# Patient Record
Sex: FEMALE | Race: WHITE | NOT HISPANIC OR LATINO | Employment: UNEMPLOYED
[De-identification: names, ages, dates, MRNs, and addresses within clinical notes are randomized per-mention and may not be internally consistent; named-entity substitution may affect disease eponyms.]

---

## 2017-04-13 ENCOUNTER — OFFICE VISIT - HEALTHEAST (OUTPATIENT)
Dept: PODIATRY | Age: 42
End: 2017-04-13

## 2017-04-13 DIAGNOSIS — M72.2 PLANTAR FASCIITIS: ICD-10-CM

## 2017-04-13 DIAGNOSIS — M20.40 HAMMER TOE, UNSPECIFIED LATERALITY: ICD-10-CM

## 2017-04-13 ASSESSMENT — MIFFLIN-ST. JEOR: SCORE: 1932.28

## 2017-05-04 ENCOUNTER — AMBULATORY - HEALTHEAST (OUTPATIENT)
Dept: PODIATRY | Age: 42
End: 2017-05-04

## 2017-05-04 DIAGNOSIS — L60.0 INGROWN TOENAIL: ICD-10-CM

## 2017-05-04 DIAGNOSIS — M72.2 PLANTAR FASCIITIS: ICD-10-CM

## 2017-05-04 ASSESSMENT — MIFFLIN-ST. JEOR: SCORE: 1932.28

## 2017-06-05 ENCOUNTER — COMMUNICATION - HEALTHEAST (OUTPATIENT)
Dept: ADMINISTRATIVE | Facility: CLINIC | Age: 42
End: 2017-06-05

## 2017-08-23 ENCOUNTER — RECORDS - HEALTHEAST (OUTPATIENT)
Dept: ADMINISTRATIVE | Facility: OTHER | Age: 42
End: 2017-08-23

## 2017-08-23 ENCOUNTER — COMMUNICATION - HEALTHEAST (OUTPATIENT)
Dept: ADMINISTRATIVE | Facility: CLINIC | Age: 42
End: 2017-08-23

## 2018-06-21 ENCOUNTER — HOME INFUSION (PRE-WILLOW HOME INFUSION) (OUTPATIENT)
Dept: PHARMACY | Facility: CLINIC | Age: 43
End: 2018-06-21

## 2018-06-22 ENCOUNTER — HOME INFUSION (PRE-WILLOW HOME INFUSION) (OUTPATIENT)
Dept: PHARMACY | Facility: CLINIC | Age: 43
End: 2018-06-22

## 2018-06-23 NOTE — PROGRESS NOTES
This is a recent snapshot of the patient's [facility] medical record.  For current drug dose and complete information and questions, call [phone] or In [facility] pool, fv home infusion ([id number])  CSN Number:  [id number]

## 2018-06-25 NOTE — PROGRESS NOTES
This is a recent snapshot of the patient's [facility] medical record.  For current drug dose and complete information and questions, call [phone] or In [facility] pool, fv home infusion ([id number])  CSN Number:  [id number]

## 2018-08-10 ENCOUNTER — HOME INFUSION (PRE-WILLOW HOME INFUSION) (OUTPATIENT)
Dept: PHARMACY | Facility: CLINIC | Age: 43
End: 2018-08-10

## 2018-08-13 NOTE — PROGRESS NOTES
This is a recent snapshot of the patient's [facility] medical record.  For current drug dose and complete information and questions, call [phone] or In Basket pool, fv home infusion ([id number])  CSN Number:  [id number]

## 2021-02-16 ENCOUNTER — TRANSCRIBE ORDERS (OUTPATIENT)
Dept: OTHER | Age: 46
End: 2021-02-16

## 2021-02-16 DIAGNOSIS — L73.2 HIDRADENITIS SUPPURATIVA: Primary | ICD-10-CM

## 2021-02-17 ENCOUNTER — TELEPHONE (OUTPATIENT)
Dept: DERMATOLOGY | Facility: CLINIC | Age: 46
End: 2021-02-17
Payer: COMMERCIAL

## 2021-02-17 NOTE — TELEPHONE ENCOUNTER
[facility] Call Center    Phone Message    May a detailed message be left on voicemail: yes     Reason for Call: Appointment Intake    Referring Provider Name: Referred by Dr Martha Maguire at [facility] to Dr Caio Coronado   Diagnosis and/or Symptoms: for hidradenitis suppurativa    Action Taken: Message routed to:  [facility] ([facility] Dermatology    Travel Screening: Not Applicable

## 2021-03-31 ENCOUNTER — OFFICE VISIT (OUTPATIENT)
Dept: DERMATOLOGY | Facility: CLINIC | Age: 46
End: 2021-03-31
Payer: COMMERCIAL

## 2021-03-31 VITALS — HEART RATE: 100 BPM | SYSTOLIC BLOOD PRESSURE: 140 MMHG | DIASTOLIC BLOOD PRESSURE: 90 MMHG

## 2021-03-31 DIAGNOSIS — L73.2 HIDRADENITIS SUPPURATIVA: Primary | ICD-10-CM

## 2021-03-31 PROCEDURE — 99204 OFFICE O/P NEW MOD 45 MIN: CPT | Performed by: PHYSICIAN ASSISTANT

## 2021-03-31 RX ORDER — SPIRONOLACTONE 50 MG/1
50 TABLET, FILM COATED ORAL 2 TIMES DAILY
Qty: 60 TABLET | Refills: 2 | Status: SHIPPED | OUTPATIENT
Start: 2021-03-31 | End: 2021-08-10

## 2021-03-31 ASSESSMENT — PAIN SCALES - GENERAL: PAINLEVEL: MILD PAIN (2)

## 2021-03-31 NOTE — PROGRESS NOTES
[facility] Dermatology Note  Encounter Date: Mar 31, 2021  Office Visit     Dermatology Problem List:  1. Hidradenitis suppurativa - spironolactone  2. Cystic acne chin- tretinoin   3. Seborrheic keratoses     ____________________________________________    Assessment & Plan:     # Hidradenitis suppurativa- axillary, inguinal, thighs, inframammary.   Discussed treatment options.   Patient may benefit from spirolactone.    -Start spironolactone 50 mg BID. For the first week take 50 mg daily and if no side-effects increase to bid dosing.   -Counseled on side effects of spironolactone including lightheadedness, urinary frequency, spotting between periods and breast tenderness.   -Counseled that spironolactone may take 3-4 months to take clinical effect.    -BP checked today   -Counseled she should avoid pregnancy. She has an IUD and  had a vasectomy.   Discussed other treatment options including isotretinoin, de-suad, Remicade, restarting Humira. Would ideally avoid oral antibiotics with her Crohns.   -A referral was sent to derm/ surgery for de-suad of post auricular lesion.    # Seborrheic keratosis, benign.  # Cellulitis left lower leg finish course of bactrim.   # Candidal intertrigo- inguinal folds, use ketoconazole 2% shampoo daily in the shower. Pt defers further therapy.     Procedures Performed:       Follow-up: 3 month(s) in-person, or earlier for new or changing lesions    Staff:     All risks, benefits and alternatives were discussed with patient.  Patient is in agreement and understands the assessment and plan.  All questions were answered.  Return to Clinic in 3 months or sooner as needed.   Alexandria Herrera PA-C   ____________________________________________    CC: Derm Problem (Estephanie is here today in order to discuss HS. She states that she started noticing flare ups after stopping her Humira tx. She states that it has continually gotten worse and has seemed to spread  other places. Maye also reports and area on her left calf she would like checked. )    HPI:  Ms. Estephanie Brown is a(n) 45 year old female who presents today as a new patient for HS. she is a referral from Dr. Martha Maguire.  She states she has had hidradenitis since she was 14 years old.  It started in the armpits and groin.  Lately it has spread to her thighs, under her breast and behind her left ear.  The area on her left ear feels with sebum and drains at times.  She is interested in having this deroofed.     She states she also has a spot on her left calf.  She recently got her tattoo we touched with ink.  She developed a very itchy area on her lower leg and redness began in the last few days.  She had a virtual visit and was started on Bactrim.  She is taken 1 pill of these.  It is no longer significantly itchy but is unsure if it is changed after 1 dose of antibiotics.  The course is for 5 days.     She is always had regular menses but was put on continuous birth control in college to help with her at bedtime flares.  She noticed that her HS and Crohn's disease flares with her menses.  She was taken off of continuous birth control due to elevated blood pressure.  Her itching is flared some when she went off her birth control.  The Mirena IUD was placed 3 years ago and she is happy on this treatment.  She has not had menses for 3 years until about 2 to 3 months ago.    She had been on Humira for 10 years to help control her Crohn's.  This was eventually stopped as this stopped helping her Crohn's and her HS flared.  She states her HS was not active on Humira.  She is managed through health [facility] for her Crohn's disease.  They are considering Remicade.  She is wondering if this would help her hidradenitis suppurativa as well.    She is working on weight loss through health [facility] clinic and found she had an elevated insulin level consistent with insulin resistance.  She is working on not stacking in between  meals to decrease her insulin levels.    Currently her her Crohn's is being managed with reducing weight and sugar in her diet as well as taking supplements.  She also uses Hibiclens on her axilla and ketoconazole in her groin area, for candida.       She does have some cystic acne on her chin and uses tretinoin on this area with improvement.    She wonders if a spot on her right cheek is a seborrheic keratosis.  She has gotten some seborrheic keratoses on her arms before but typically picks them off.      Patient is otherwise feeling well, without additional skin concerns.           Labs Reviewed:  NA    Physical Exam:  Vitals: BP (!) 140/90   Pulse 100   SKIN: Full skin, which includes the head/face, both arms, chest, back, abdomen,both legs, genitalia and/or groin buttocks, digits and/or nails, was examined.  - There are violaceous macules on the upper thighs, left axilla.   There are a few nodules on the left axilla left and left inframammary area, right upper thigh.   Striae noted to axilla.   There is a skin colored left post auricular thin nodule with tracking.   There are waxy stuck on papules on the right forearm and plaque on the right malar cheek.  There is erythema noted adjacent to the tattoo on her left calf.    Inguinal folds display mild erythema with soft scale   - No other lesions of concern on areas examined.     Medications:  No current outpatient medications on file.     No current facility-administered medications for this visit.       Past Medical History:   There is no problem list on file for this patient.    History reviewed. No pertinent past medical history.    CC No referring provider defined for this encounter. on close of this encounter.

## 2021-03-31 NOTE — LETTER
Date:April 5, 2021      Patient was self referred, no letter generated. Do not send.        [facility] Health Information

## 2021-03-31 NOTE — NURSING NOTE
Dermatology Rooming Note    Estephanie GIOVANNA May's goals for this visit include:   Chief Complaint   Patient presents with     Derm Problem     Estephanie is here today in order to discuss HS. She states that she started noticing flare ups after stopping her Humira tx. She states that it has continually gotten worse and has seemed to spread other places. Maye also reports and area on her left calf she would like checked.      Milo Howard, EMT

## 2021-03-31 NOTE — LETTER
3/31/2021       RE: Estephanie Brown  [address]  [place] [address]     Dear Colleague,    Thank you for referring your patient, Estephanie Brown, to the [facility] DERMATOLOG[facility] CL[facility]IC [place] at [facility]. Please see a copy of my visit note below.    [facility] Dermatology Note  Encounter Date: Mar 31, 2021  Office Visit     Dermatology Problem List:  1. Hidradenitis suppurativa - spironolactone  2. Cystic acne chin- tretinoin   3. Seborrheic keratoses     ____________________________________________    Assessment & Plan:     # Hidradenitis suppurativa- axillary, inguinal, thighs, inframammary.   Discussed treatment options.   Patient may benefit from spirolactone.    -Start spironolactone 50 mg BID. For the first week take 50 mg daily and if no side-effects increase to bid dosing.   -Counseled on side effects of spironolactone including lightheadedness, urinary frequency, spotting between periods and breast tenderness.   -Counseled that spironolactone may take 3-4 months to take clinical effect.    -BP checked today   -Counseled she should avoid pregnancy. She has an IUD and  had a vasectomy.   Discussed other treatment options including isotretinoin, de-suad, Remicade, restarting Humira. Would ideally avoid oral antibiotics with her Crohns.   -A referral was sent to derm/ surgery for de-suad of post auricular lesion.    # Seborrheic keratosis, benign.  # Cellulitis left lower leg finish course of bactrim.   # Candidal intertrigo- inguinal folds, use ketoconazole 2% shampoo daily in the shower. Pt defers further therapy.     Procedures Performed:       Follow-up: 3 month(s) in-person, or earlier for new or changing lesions    Staff:     All risks, benefits and alternatives were discussed with patient.  Patient is in agreement and understands the assessment and plan.  All questions were answered.  Return to Clinic in 3  months or sooner as needed.   Alexandria Herrera PA-C   ____________________________________________    CC: Derm Problem (Estephanie is here today in order to discuss HS. She states that she started noticing flare ups after stopping her Humira tx. She states that it has continually gotten worse and has seemed to spread other places. Maye also reports and area on her left calf she would like checked. )    HPI:  Ms. Estephanie Brown is a(n) 45 year old female who presents today as a new patient for HS. she is a referral from Dr. Martha Maguire.  She states she has had hidradenitis since she was 14 years old.  It started in the armpits and groin.  Lately it has spread to her thighs, under her breast and behind her left ear.  The area on her left ear feels with sebum and drains at times.  She is interested in having this deroofed.     She states she also has a spot on her left calf.  She recently got her tattoo we touched with ink.  She developed a very itchy area on her lower leg and redness began in the last few days.  She had a virtual visit and was started on Bactrim.  She is taken 1 pill of these.  It is no longer significantly itchy but is unsure if it is changed after 1 dose of antibiotics.  The course is for 5 days.     She is always had regular menses but was put on continuous birth control in college to help with her at bedtime flares.  She noticed that her HS and Crohn's disease flares with her menses.  She was taken off of continuous birth control due to elevated blood pressure.  Her itching is flared some when she went off her birth control.  The Mirena IUD was placed 3 years ago and she is happy on this treatment.  She has not had menses for 3 years until about 2 to 3 months ago.    She had been on Humira for 10 years to help control her Crohn's.  This was eventually stopped as this stopped helping her Crohn's and her HS flared.  She states her HS was not active on Humira.  She is managed through [facility] for  her Crohn's disease.  They are considering Remicade.  She is wondering if this would help her hidradenitis suppurativa as well.    She is working on weight loss through [facility] and found she had an elevated insulin level consistent with insulin resistance.  She is working on not stacking in between meals to decrease her insulin levels.    Currently her her Crohn's is being managed with reducing weight and sugar in her diet as well as taking supplements.  She also uses Hibiclens on her axilla and ketoconazole in her groin area, for candida.       She does have some cystic acne on her chin and uses tretinoin on this area with improvement.    She wonders if a spot on her right cheek is a seborrheic keratosis.  She has gotten some seborrheic keratoses on her arms before but typically picks them off.      Patient is otherwise feeling well, without additional skin concerns.           Labs Reviewed:  NA    Physical Exam:  Vitals: BP (!) 140/90   Pulse 100   SKIN: Full skin, which includes the head/face, both arms, chest, back, abdomen,both legs, genitalia and/or groin buttocks, digits and/or nails, was examined.  - There are violaceous macules on the upper thighs, left axilla.   There are a few nodules on the left axilla left and left inframammary area, right upper thigh.   Striae noted to axilla.   There is a skin colored left post auricular thin nodule with tracking.   There are waxy stuck on papules on the right forearm and plaque on the right malar cheek.  There is erythema noted adjacent to the tattoo on her left calf.    Inguinal folds display mild erythema with soft scale   - No other lesions of concern on areas examined.     Medications:  No current outpatient medications on file.     No current facility-administered medications for this visit.       Past Medical History:   There is no problem list on file for this patient.    History reviewed. No pertinent past medical history.    CC No referring  provider defined for this encounter. on close of this encounter.                                  Again, thank you for allowing me to participate in the care of your patient.      Sincerely,    Alexandria Herrera PA-C

## 2021-05-01 ENCOUNTER — HEALTH MAINTENANCE LETTER (OUTPATIENT)
Age: 46
End: 2021-05-01

## 2021-05-27 NOTE — TELEPHONE ENCOUNTER
FUTURE VISIT INFORMATION      FUTURE VISIT INFORMATION:    Date: 6.15.21    Time: 3:15    Location: [facility]  REFERRAL INFORMATION:    Referring provider:  Alexandria Herrera PA-C    Referring providers clinic:  [facility]    Reason for visit/diagnosis  HS de-suad HS lesions left ear, in person per Dr. Paniagua    RECORDS REQUESTED FROM:       Clinic name Comments Records Status Photos Status   [facility] 3.31.21  Alexandria Herrera [facility]

## 2021-05-28 ENCOUNTER — RECORDS - HEALTHEAST (OUTPATIENT)
Dept: ADMINISTRATIVE | Facility: CLINIC | Age: 46
End: 2021-05-28

## 2021-05-29 ENCOUNTER — RECORDS - HEALTHEAST (OUTPATIENT)
Dept: ADMINISTRATIVE | Facility: CLINIC | Age: 46
End: 2021-05-29

## 2021-05-30 ENCOUNTER — RECORDS - HEALTHEAST (OUTPATIENT)
Dept: ADMINISTRATIVE | Facility: CLINIC | Age: 46
End: 2021-05-30

## 2021-05-30 VITALS — HEIGHT: 70 IN | BODY MASS INDEX: 37.94 KG/M2 | WEIGHT: 265 LBS

## 2021-05-31 ENCOUNTER — RECORDS - HEALTHEAST (OUTPATIENT)
Dept: ADMINISTRATIVE | Facility: CLINIC | Age: 46
End: 2021-05-31

## 2021-05-31 VITALS — BODY MASS INDEX: 37.94 KG/M2 | WEIGHT: 265 LBS | HEIGHT: 70 IN

## 2021-06-01 ENCOUNTER — RECORDS - HEALTHEAST (OUTPATIENT)
Dept: ADMINISTRATIVE | Facility: CLINIC | Age: 46
End: 2021-06-01

## 2021-06-02 ENCOUNTER — RECORDS - HEALTHEAST (OUTPATIENT)
Dept: ADMINISTRATIVE | Facility: CLINIC | Age: 46
End: 2021-06-02

## 2021-06-03 ENCOUNTER — OFFICE VISIT (OUTPATIENT)
Dept: DERMATOLOGY | Facility: CLINIC | Age: 46
End: 2021-06-03
Payer: COMMERCIAL

## 2021-06-03 VITALS — DIASTOLIC BLOOD PRESSURE: 79 MMHG | WEIGHT: 293 LBS | SYSTOLIC BLOOD PRESSURE: 125 MMHG | HEART RATE: 93 BPM

## 2021-06-03 DIAGNOSIS — L73.2 HIDRADENITIS SUPPURATIVA: Primary | ICD-10-CM

## 2021-06-03 PROCEDURE — 11900 INJECT SKIN LESIONS </W 7: CPT | Performed by: DERMATOLOGY

## 2021-06-03 PROCEDURE — 99213 OFFICE O/P EST LOW 20 MIN: CPT | Mod: 25 | Performed by: DERMATOLOGY

## 2021-06-03 RX ORDER — MULTIVIT-MIN/IRON/FOLIC ACID/K 18-600-40
CAPSULE ORAL DAILY
COMMUNITY

## 2021-06-03 RX ORDER — LOPERAMIDE HYDROCHLORIDE 2 MG/1
TABLET ORAL DAILY
COMMUNITY

## 2021-06-03 RX ORDER — NICOTINE 14MG/24HR
PATCH, TRANSDERMAL 24 HOURS TRANSDERMAL DAILY
COMMUNITY

## 2021-06-03 RX ORDER — EPINEPHRINE 0.15 MG/.3ML
INJECTION INTRAMUSCULAR PRN
COMMUNITY

## 2021-06-03 RX ORDER — AMITRIPTYLINE HYDROCHLORIDE 10 MG/1
TABLET ORAL DAILY
COMMUNITY
Start: 2021-02-11

## 2021-06-03 RX ORDER — ALBUTEROL SULFATE 90 UG/1
AEROSOL, METERED RESPIRATORY (INHALATION)
COMMUNITY

## 2021-06-03 RX ORDER — GABAPENTIN 300 MG/1
CAPSULE ORAL 3 TIMES DAILY
COMMUNITY

## 2021-06-03 RX ORDER — ALPRAZOLAM 0.25 MG
TABLET ORAL
COMMUNITY

## 2021-06-03 RX ORDER — SIMETHICONE 80 MG
TABLET,CHEWABLE ORAL DAILY
COMMUNITY

## 2021-06-03 RX ORDER — AMLODIPINE BESYLATE 5 MG/1
TABLET ORAL DAILY
COMMUNITY
Start: 2021-04-19

## 2021-06-03 RX ORDER — VENLAFAXINE HYDROCHLORIDE 150 MG/1
CAPSULE, EXTENDED RELEASE ORAL DAILY
COMMUNITY
Start: 2021-03-12

## 2021-06-03 RX ORDER — FOLIC ACID 1 MG/1
TABLET ORAL DAILY
COMMUNITY

## 2021-06-03 RX ORDER — KETOCONAZOLE 20 MG/ML
SHAMPOO TOPICAL
COMMUNITY
Start: 2021-02-09

## 2021-06-03 RX ORDER — MULTIPLE VITAMINS W/ MINERALS TAB 9MG-400MCG
TAB ORAL DAILY
COMMUNITY

## 2021-06-03 RX ORDER — MONTELUKAST SODIUM 4 MG/1
TABLET, CHEWABLE ORAL DAILY
COMMUNITY
Start: 2021-05-06

## 2021-06-03 RX ORDER — CHLORAL HYDRATE 500 MG
CAPSULE ORAL DAILY
COMMUNITY

## 2021-06-03 RX ORDER — MULTIVITAMIN WITH IRON
TABLET ORAL DAILY
COMMUNITY

## 2021-06-03 RX ORDER — LOSARTAN POTASSIUM 100 MG/1
TABLET ORAL DAILY
COMMUNITY
Start: 2021-03-15

## 2021-06-03 RX ORDER — BUSPIRONE HYDROCHLORIDE 10 MG/1
TABLET ORAL 3 TIMES DAILY
COMMUNITY
Start: 2021-03-15

## 2021-06-03 RX ORDER — ASPIRIN 81 MG/1
TABLET, CHEWABLE ORAL
COMMUNITY

## 2021-06-03 RX ORDER — FLUCONAZOLE 150 MG/1
TABLET ORAL DAILY
COMMUNITY
Start: 2021-03-30

## 2021-06-03 ASSESSMENT — PAIN SCALES - GENERAL: PAINLEVEL: MILD PAIN (3)

## 2021-06-03 NOTE — PROGRESS NOTES
Drug Administration Record    Prior to injection, verified patient identity using patient's name and date of birth.  Due to injection administration, patient instructed to remain in clinic for 15 minutes  afterwards, and to report any adverse reaction to me immediately.    Drug Name: triamcinolone acetonide(kenalog)  Dose: 4mL of triamcinolone 10mg/mL, 40mg dose  Route administered: ID  NDC #: Kenalog-10 (9353-1960-97)  Amount of waste(mL):1mL  Reason for waste: Single use vial    LOT #: [id number]  SITE: see note  : [employer]  EXPIRATION DATE: sept 2022

## 2021-06-03 NOTE — NURSING NOTE
Chief Complaint   Patient presents with     Derm Problem     HS, new, flaring ears, groin, breast, buttocks, abdomen     Milena Ruiz, EMT

## 2021-06-03 NOTE — LETTER
6/3/2021       RE: Estephanie Brown  [address]  [place] [address]     Dear Colleague,    Thank you for referring your patient, Estephanie Brown, to the [facility] DERMATOLOGY CLINIC [place] at [facility]. Please see a copy of my visit note below.    HS Uun-it-Sviea Checklist      Follow up: virtual     Labs today? NO     Fax labs to outside [place] location:      Imaging needed? NO    Referrals placed? NO    Infusion Start? NO    Infusion change/dose increase NO    Biologics change? NO    Wound care supplies? (print orders for faxing) NO    Reach out to outside providers? NO    Photos and photo consent done? NO    [facility] Dermatology Note  Encounter Date: Ellis 3, 2021  Office Visit     Dermatology Problem List:  1. Hidradenitis suppurativa  2. Cystic acne chin  3. Seborrheic keratoses  4. Mirena in place  5. Crohns disease  - Previously treated with:     Humira - helped but stopped helping Crohn's so they stopped it   Enbrel  - did not help HS or Crohns   Stelara - did not help HS or Crohns   Azathioprine - Helped Crohns but not HS. Stopped azathioprine  because of elvated LFTs, but Crohns never flard back up  6. 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 Oral allergy syndrome  Family hx of colitis: Father, Maternal uncle  Patient also exists in our medical record with MRN: [id number] & [id number]    ____________________________________________    Assessment & Plan:   # Hidradenitis suppurativa.  -Slightly improved on spironolactone and starting metformin. She would like to see how this combination does before making any changes.    - We discussed several options including re-trying humira, now that crohns is controlled, infliximab (which she is hesitant to try due to her family member's experience), clinical trial vs other  - We injected 4 lesions with kenalog-10 with a total of 4cc      Follow-up: Will schedule follow-up in 12 weeks  to see how she responds to the metformin/spironolactone      Staff:     Caio Coronado MD, FAAD    Departments of Internal Medicine and Dermatology  [facility]  [phone]    Addendum 6/29/21:   Pt developed nausea with hepatitis with cholestatic picture after a month of combination spironolactone and metformin. She stopped the meds and is doing better. Will schedule 6 week phone follow-up to decide on next steps.  __________________________________________    CC: Derm Problem (HS, new, flaring ears, groin, breast, buttocks, abdomen)    HPI:  Ms. Estephanie Brown is a(n) 46 year old female who presents today as a return patient for Hidradenitis suppurativa. She developed her first HS symptoms at age 14 and has been dealing with this since. She also has a history of Crohn's disease and was on Humira for a while for this, and her HS symptoms were significantly reduced. She unfortunately had to stop these injections though as her Crohns symptoms were not well controlled. She then tried other meds for her Crohns including stelara and azathioprine which did not help the HS. Currently the Crohns is well quiet but the HS flared.  She szaw Alexandria Herrera in March who started her on spironolactone. She just started this 2 weeks ago. She is also planning on starting metformin for elevated fasting insulin levels. Prescribed by PCP.    Patient is otherwise feeling well, without additional skin concerns.    - No hx of pregnancy   - Has been  Intermittent fasting    HS Nurse Assessment    Nurse Assessment Data 6/3/2021   Over the past month, about how many recurrent or new boils have you had? 11   Over the past week, how many dressing changes do you do each day? 1   Over the past week, has your wound drainage been: Moderate   Rate your HS overall from 0-10 (0 = no disease, 10 = worst) over the past week:  7   Rate your pain score from 0-10 (0 = no disease, 10 = worst) for the most  "painful/symptomatic lesion in the past week:  5 - Moderate Pain   Over the past week, how much has HS influenced your quality of life? very much     Physical Exam:  Vitals: /79 (BP Location: Left arm)   Pulse 93   Wt 136.1 kg (300 lb)     HS Exam  HS Exam  Head/Neck 6/3/2021   Head/Neck Comments Scarring and scar tunnels behind ears with small cysts       Left Axilla 6/3/2021   Left Axilla Comments 3 scarred pink papules; Elia notes some \"tunneling\" in the area         Chest 6/3/2021   # of inflamed nodules 1   # of abcesses 0   # of draining tunnels 0       Abdomen 6/3/2021   Abdomen Comments 1 pink scarred papule on the upper abdomen        Pubis/Genital 6/3/2021   # of inflamed nodules 0   # of abcesses 0   # of draining tunnels 1       Left Thigh 6/3/2021   # of inflamed nodules 1   # of abcesses 0   # of draining tunnels 0       No flowsheet data found.    No flowsheet data found.    Buttocks 6/3/2021   Pilonidal Disease? No   Pioderma Gangrenosum? No   Cutaneous Crohn's? No       HS Data  HS Exam Data 6/3/2021   LC Type LC1   Clinical Subtypes Regular type   Acne? No   Dissecting Cellulitis? No   Total Robertson Stage II   Total Inflammatory Nodules 2   Total Abcesses 0   Total Draining Tunnels 1   Total Abscess and Nodule Count 2   IHS4 Score  6         - No other lesions of concern on areas examined.     Medications:  Current Outpatient Medications   Medication     Acetaminophen 325 MG CAPS     albuterol (PROAIR HFA/PROVENTIL HFA/VENTOLIN HFA) 108 (90 Base) MCG/ACT inhaler     ALPRAZolam (XANAX) 0.25 MG tablet     amitriptyline (ELAVIL) 10 MG tablet     amLODIPine (NORVASC) 5 MG tablet     Ascorbic Acid (VITAMIN C) 500 MG CHEW     aspirin (ASA) 81 MG chewable tablet     busPIRone (BUSPAR) 10 MG tablet     colestipol (COLESTID) 1 g tablet     Cyanocobalamin 1000 MCG/15ML LIQD     EPINEPHrine (EPIPEN JR) 0.15 MG/0.3ML injection 2-pack     fish oil-omega-3 fatty acids 1000 MG capsule     fluconazole " (DIFLUCAN) 150 MG tablet     folic acid (FOLVITE) 1 MG tablet     gabapentin (NEURONTIN) 300 MG capsule     ketoconazole (NIZORAL) 2 % external shampoo     levonorgestrel (MIRENA) 20 MCG/24HR IUD     loperamide (IMODIUM A-D) 2 MG tablet     losartan (COZAAR) 100 MG tablet     magnesium 250 MG tablet     metFORMIN (GLUCOPHAGE) 500 MG tablet     multivitamin w/minerals (MULTI-VITAMIN) tablet     Saccharomyces boulardii (PROBIOTIC) 250 MG CAPS     simethicone (MYLICON) 80 MG chewable tablet     venlafaxine (EFFEXOR-XR) 150 MG 24 hr capsule     Vitamin D, Cholecalciferol, 25 MCG (1000 UT) TABS     spironolactone (ALDACTONE) 50 MG tablet     No current facility-administered medications for this visit.       Past Medical History:      No referring provider defined for this encounter. on close of this encounter.      Drug Administration Record    Prior to injection, verified patient identity using patient's name and date of birth.  Due to injection administration, patient instructed to remain in clinic for 15 minutes  afterwards, and to report any adverse reaction to me immediately.    Drug Name: triamcinolone acetonide(kenalog)  Dose: 4mL of triamcinolone 10mg/mL, 40mg dose  Route administered: ID  NDC #: Kenalog-10 (4041-2781-80)  Amount of waste(mL):1mL  Reason for waste: Single use vial    LOT #: [id number]  SITE: see note  : [employer]  EXPIRATION DATE: sept 2022

## 2021-06-03 NOTE — PROGRESS NOTES
HS Zmq-es-Heizn Checklist      Follow up: virtual     Labs today? NO     Fax labs to outside [place] location:      Imaging needed? NO    Referrals placed? NO    Infusion Start? NO    Infusion change/dose increase NO    Biologics change? NO    Wound care supplies? (print orders for faxing) NO    Reach out to outside providers? NO    Photos and photo consent done? NO

## 2021-06-10 NOTE — PROGRESS NOTES
Assessment: Left plantar fasciitis; ingrown medial border of the right 3rd toenail.    Plan: We repeated the cortisone injection of the left heel.  She was given 4 mg of dexamethasone sodium phosphate along the plantar fascial insertion.  Attention was directed to the right third toe.  Local anesthesia was given, consisting of 2 ml of2% lidocaine plain. The toe was prepped with betadine. A tourniquet was fixed at the base of the toe to maintain local hemostasis. The offending nail border was freed from surrounding soft tissues and excised. The nail root was treated with phenol for 30 seconds x3 applications. The site was flushed with alcohol and dressed with bacitracin, Telfa and gauze. The tourniquet was removed and good perfusion was restored to the toe. The patient was given written and verbal post-procedure instructions. Follow up here as needed.        Subjective: The patient returns to the [facility] with continued left plantar heel pain and an ingrown right third toenail.  At her last visit we talked about removing the medial border of the right third toenail, and she is ready to proceed with that today.  Cortisone injection in the left heel did not provide much relief, and she is interested in repeating that injection.    Physical Exam:  General: Pleasant 42 y.o. female in no acute distress.  Vascular: DP pulses are palpable. PT pulses are palpable. Pedal hair is present. Feet are warm to the touch.  Cardiac: Pulse is regular.  Lymphatic: No edema at the ankles.  Neuro: Sensation in the feet is grossly intact to light touch.  Derm: Medial side of the right third toenail is incurved and mildly tender to touch today.  No active bleeding or drainage.  Musculoskeletal: Tenderness around the medial and plantar side of the left heel at the plantar fascial insertion.

## 2021-06-10 NOTE — PROGRESS NOTES
"ASSESSMENT: Left plantar fasciitis, bilateral fourth hammertoes with prominent skin flaps plantarly, ingrown toenail    PLAN: She was given an injection of 4 mg of dexamethasone sodium phosphate into the left heel at the plantar fascial insertion.  She tolerated that well.  She will mention plantar fasciitis to her physical therapist and pursue treatment there.  We reviewed simple home stretching techniques to improve pain.  She would like to schedule bilateral fourth toe skinplasty to remove the prominent skin wedges that underlapped the neighboring third toes.  We reviewed the procedure and what to expect during her recovery.  She will also schedule a partial nail avulsion with chemical matrixectomy for the medial side of the right third toenail.  We can do that procedure in the clinic.  She will need a preoperative history and physical for the toe skinplasty.      SUBJECTIVE: New patient visit at the [facility] regarding left heel pain and skin irritation on the bilateral fourth toes.  She has had some trouble with her toenails since starting the methotrexate.  The right great toenail has fallen off and regrown at least once.  The medial edge of the right third toenail tends to curve and become ingrown as well.  No acute infection there today, but she would like to have that treated in the future.  She has a history of plantar fasciitis and an x-ray in 2009 demonstrated a plantar calcaneal spur.  She recalls having a cortisone injection in the heel almost 13 years ago.  Good relief from now until the last few months.  No new trauma.  She describes post static pain.  No radiation into the Achilles.    PHYSICAL EXAM:  Pulse 92  Resp 14  Ht 5' 10\" (1.778 m)  Wt (!) 265 lb (120.2 kg)  BMI 38.02 kg/m2  General: Pleasant 41 y.o. female in no acute distress.  Vascular: DP pulses are palpable. PT pulses are palpable. Pedal hair is present. Feet are warm to the touch.  Cardiac: Pulse is regular.  Lymphatic: " No edema at the ankles.  Neuro: Sensation in the feet is grossly intact to light touch.  Derm: Toenails are mildly dystrophic.  The right third toenail is incurved at the medial border.  Prominent skin plantarly on the bilateral fourth toes has moved under the neighboring third toes causing irritation with weightbearing.  Musculoskeletal: Bilateral fourth toes are medially deviated under the third toes.  Bunionettes are noted.  Pain with palpation at the proximal insertion of the left plantar fascia.      Allergies   Allergen Reactions     Morphine        No current outpatient prescriptions on file.     No current facility-administered medications for this visit.        Family History:  family history is not on file.    Social History:  Reviewed, and she reports that she has never smoked. She does not have any smokeless tobacco history on file.    Review of Systems:  A 12 point comprehensive review of systems was negative except as noted.

## 2021-06-15 ENCOUNTER — TELEPHONE (OUTPATIENT)
Dept: DERMATOLOGY | Facility: CLINIC | Age: 46
End: 2021-06-15

## 2021-06-15 ENCOUNTER — OFFICE VISIT (OUTPATIENT)
Dept: DERMATOLOGY | Facility: CLINIC | Age: 46
End: 2021-06-15
Payer: COMMERCIAL

## 2021-06-15 ENCOUNTER — PRE VISIT (OUTPATIENT)
Dept: DERMATOLOGY | Facility: CLINIC | Age: 46
End: 2021-06-15

## 2021-06-15 DIAGNOSIS — L73.2 HIDRADENITIS SUPPURATIVA: Primary | ICD-10-CM

## 2021-06-15 PROCEDURE — 99213 OFFICE O/P EST LOW 20 MIN: CPT | Mod: GC | Performed by: DERMATOLOGY

## 2021-06-15 ASSESSMENT — PAIN SCALES - GENERAL: PAINLEVEL: MILD PAIN (3)

## 2021-06-15 NOTE — PROGRESS NOTES
Dermatologic Surgery Note    Dermatology Surgery Clinic  [facility]  [address] [place] [id number]    Dermatology Problem List  (1) Hidradenitis Suppurativa     Subjective: Ms. Brown is a pleasant 46 year old woman with history of hidradenitis suppurativa who presents today for consultation for surgical intervention of HS.     The patient has a history of hidradenitis since the age of 14, she had tg Robertson stage I up until about 18 months ago. Areas on the bilateral axillae are most bothersome as well as in the post auricular sulci. She is currently on spironolactone which seems to be controlling her disease moderately well. She also has Crohn's disease and has been treated in the past with Humira, Enbrel, Azathioprine, which all became ineffective in time.     She is interested in considering surgical intervention for areas behind the bilateral ears and the axillae.      She reports developing cysts in her retroauricular area though she has none at present    Objective:   Gen: This is a well appearing female in no acute distress. Patient is alert and oriented x 3.  An exam of the face neck and axilla was performed today   - Left axilla with scattered pink to red brown nodules and few puncta/sinus tracts   - Bilateral post auricular sulci with numerous minute (<0.05 mm) skin colored puncta without significant underlying substance     Assessment and Plan:     (1) Hidradenitis suppurativa  (2) Post auricular puncta      Patient was discussed with and evaluated by attending physician Dr. dayana Hastings MD  PGY-6    Micrographic Surgery and Dermatologic Oncology Fellow  Jamee 15, 2021      Attending Attestation:  I personally interviewed and examined the patient.  The patient was discussed with the resident.  I reviewed the resident note and agree with the findings.  Any edits are below.    History: HS and crohns history    PE: open comedomes in  retroauricular sulcus    A/P: HS -- she has axillary disease amenable to surgical intervention but is also not on maximized medical therapy.  Discussed these two options and the limitations of surgery in the setting of mainly inflammatory disease.  The areas behind her ears are more comedomal/transient pustules and are not present now.  As such I don't feel that surgery would add any real benefit to her at this time.      Marcial Paniagua M.D.  Professor  Director of Dermatologic Surgery  Department of Dermatology

## 2021-06-15 NOTE — LETTER
6/15/2021       RE: Estephanie Brown  [address] [facility] [address]     Dear Colleague,    Thank you for referring your patient, Estephanie Brown, to the [facility]INIC [place] at [facility]. Please see a copy of my visit note below.    Dermatologic Surgery Note    Dermatology Surgery Clinic  [facility]  [address] [place] [id number]    Dermatology Problem List  (1) Hidradenitis Suppurativa     Subjective: Ms. Brown is a pleasant 46 year old woman with history of hidradenitis suppurativa who presents today for consultation for surgical intervention of HS.     The patient has a history of hidradenitis since the age of 14, she had tg Robertson stage I up until about 18 months ago. Areas on the bilateral axillae are most bothersome as well as in the post auricular sulci. She is currently on spironolactone which seems to be controlling her disease moderately well. She also has Crohn's disease and has been treated in the past with Humira, Enbrel, Azathioprine, which all became ineffective in time.     She is interested in considering surgical intervention for areas behind the bilateral ears and the axillae.      She reports developing cysts in her retroauricular area though she has none at present    Objective:   Gen: This is a well appearing female in no acute distress. Patient is alert and oriented x 3.  An exam of the face neck and axilla was performed today   - Left axilla with scattered pink to red brown nodules and few puncta/sinus tracts   - Bilateral post auricular sulci with numerous minute (<0.05 mm) skin colored puncta without significant underlying substance     Assessment and Plan:     (1) Hidradenitis suppurativa  (2) Post auricular puncta      Patient was discussed with and evaluated by attending physician Dr. dayana Hastings MD  PGY-6    Micrographic Surgery and  Dermatologic Oncology Fellow  Jamee 15, 2021      Attending Attestation:  I personally interviewed and examined the patient.  The patient was discussed with the resident.  I reviewed the resident note and agree with the findings.  Any edits are below.    History: HS and crohns history    PE: open comedomes in retroauricular sulcus    A/P: HS -- she has axillary disease amenable to surgical intervention but is also not on maximized medical therapy.  Discussed these two options and the limitations of surgery in the setting of mainly inflammatory disease.  The areas behind her ears are more comedomal/transient pustules and are not present now.  As such I don't feel that surgery would add any real benefit to her at this time.      Marcial Paniagua M.D.  Professor  Director of Dermatologic Surgery  Department of Dermatology                Drug Administration Record    Prior to injection, verified patient identity using patient's name and date of birth.  Due to injection administration, patient instructed to remain in clinic for 15 minutes  afterwards, and to report any adverse reaction to me immediately.    Drug Name: triamcinolone acetonide(kenalog)  Dose: mL  Route administered: ID  NDC #: Kenalog-10 (6893-1931-76)  Amount of waste(mL):4ML  Reason for waste: Single use vial    LOT #: [id number]  SITE: see note  : [employer]  EXPIRATION DATE: SEP 2022

## 2021-06-15 NOTE — PROGRESS NOTES
Drug Administration Record    Prior to injection, verified patient identity using patient's name and date of birth.  Due to injection administration, patient instructed to remain in clinic for 15 minutes  afterwards, and to report any adverse reaction to me immediately.    Drug Name: triamcinolone acetonide(kenalog)  Dose: mL  Route administered: ID  NDC #: Kenalog-10 (0345-0695-77)  Amount of waste(mL):4ML  Reason for waste: Single use vial    LOT #: [id number]  SITE: see note  : [place]  EXPIRATION DATE: SEP 2022

## 2021-06-15 NOTE — TELEPHONE ENCOUNTER
[facility] Call Center    Phone Message    May a detailed message be left on voicemail: yes     Reason for Call: Appointment Intake    Referring Provider Name:   Pt of Dr Coronado    Diagnosis and/or Symptoms:   Pt requesting Nurse appt for injecting HS spots    Action Taken: Message routed to:  [facility] ([facility] Derm    Travel Screening: Not Applicable     Pt has appt 6/15/21 at 3:15 and wants to have Nurse injections as well. Please call Pt to advise.    Thanks!

## 2021-06-15 NOTE — NURSING NOTE
Chief Complaint   Patient presents with     Derm Problem     here for HS lesions behind ears that keep filling, would like these areas removed     Milena Ruiz, EMT

## 2021-06-16 NOTE — TELEPHONE ENCOUNTER
Informed the patient that we are not able to add people to the nurse visit for injections.    Debbie Kendrick, [facility]

## 2021-07-21 ENCOUNTER — RECORDS - HEALTHEAST (OUTPATIENT)
Dept: ADMINISTRATIVE | Facility: CLINIC | Age: 46
End: 2021-07-21

## 2021-08-06 DIAGNOSIS — L73.2 HIDRADENITIS SUPPURATIVA: ICD-10-CM

## 2021-08-10 RX ORDER — SPIRONOLACTONE 50 MG/1
50 TABLET, FILM COATED ORAL 2 TIMES DAILY
Qty: 60 TABLET | Refills: 0 | Status: SHIPPED | OUTPATIENT
Start: 2021-08-10 | End: 2021-09-07

## 2021-08-10 NOTE — TELEPHONE ENCOUNTER
Spironolactone Oral Tablet 50 MG  Last Written Prescription Date:  3/31/2021-5/30/2021  Last Fill Quantity: 60,   # refills: 2  Last Office Visit : 6/15/2021  Future Office visit:  8/12/2021    Routing refill request to provider for review/approval because:  Last order filled with a start and end date??  Continue same dose and orders?  Refer to Provider for review       Ilda Rivas RN  Central Triage Red Flags/Med Refills

## 2021-08-14 DIAGNOSIS — L73.2 HIDRADENITIS SUPPURATIVA: ICD-10-CM

## 2021-08-18 RX ORDER — SPIRONOLACTONE 50 MG/1
TABLET, FILM COATED ORAL
Qty: 60 TABLET | Refills: 0 | OUTPATIENT
Start: 2021-08-18

## 2021-09-02 DIAGNOSIS — L73.2 HIDRADENITIS SUPPURATIVA: ICD-10-CM

## 2021-09-07 NOTE — TELEPHONE ENCOUNTER
spironolactone (ALDACTONE) 50 MG tablet      Last Written Prescription Date:  8/10/2021  Last Fill Quantity: 60 tab,   # refills: 0  Last Office Visit : 8/12/2021  Future Office visit:  9/9/2021    Routing refill request to provider for review/approval because:  Failed medication protocol: labs due  - recommended labs past due Creatinine, Potassium, Sodium

## 2021-09-08 RX ORDER — SPIRONOLACTONE 50 MG/1
50 TABLET, FILM COATED ORAL 2 TIMES DAILY
Qty: 180 TABLET | Refills: 0 | Status: SHIPPED | OUTPATIENT
Start: 2021-09-08 | End: 2022-01-04

## 2021-09-09 ENCOUNTER — VIRTUAL VISIT (OUTPATIENT)
Dept: DERMATOLOGY | Facility: CLINIC | Age: 46
End: 2021-09-09
Payer: COMMERCIAL

## 2021-09-09 DIAGNOSIS — L73.2 HIDRADENITIS SUPPURATIVA: Primary | ICD-10-CM

## 2021-09-09 PROCEDURE — 99214 OFFICE O/P EST MOD 30 MIN: CPT | Mod: TEL | Performed by: DERMATOLOGY

## 2021-09-09 NOTE — PROGRESS NOTES
[facility] Dermatology Note  Encounter Date: Sep 9, 2021  Store-and-Forward and Telephone ([phone] ). Location of teledermatologist: [facility]MAT[facility] [place].  Start time: 8:15pm . End time: 8:37     Dermatology Problem List:  1. Hidradenitis suppurativa  2. Cystic acne chin  3. Seborrheic keratoses  4. Mirena in place  5. Crohns disease  - Previously treated with:                Humira - helped but stopped helping Crohn's so they stopped it              Enbrel  - did not help HS or Crohns              Stelara - did not help HS or Crohns              Azathioprine - Helped Crohns but not HS. Stopped azathioprine     because of elvated LFTs, but Crohns never flard back up  6. 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 Oral allergy syndrome  Family hx of colitis: Father, Maternal uncle  Patient also exists in our medical record with MRN: [id number] & [id number]     ____________________________________________     Assessment & Plan:   # HS  -  Doing OK. Just started spironolactone again and rechecking liver function in 3 weeks. If OK, will increase to 100mg daily and touch base in 12 weeks via phone to see if she wants to try something new. IBD has been flaring more. LFTs were >5x upper limit ofnormal. While I couldn't see her results this past week, she states LFTs wnl.  She notes oral ulcers and GI symptomsworse. I suspect crohns flaring. Would try golimumab next.     Follow-up: 12 weeks     Staff:     Caio Coronado MD, FAAD, FACP     Departments of Internal Medicine and Dermatology  [facility]  [phone]  ____________________________________________     CC: Follow-up     HPI:  Ms. Estephanie Brown is a(n) 46 year old female who presents today as a return patient for HS. The patient was last seen by myself on 6/3/21 at which time she was continued on spironolactone and metformin. We discussed adding Humira vs infliximab vs clinical trial,  and elected to hold off for now. Additionally, 4 lesions were injected with ILK 10. In the mean time, she was seen by Dr. Paniagua, who did not think she was a good candidate for surgical excision at this time.       Lesions ar coming in new places. Extending to thigh and buttocks.    Started using pit liquor that is ETOH based and seems to be helping under arms.    She started metformin and spironolactone together and had to stop because of the liver. She restarted the spironolactone alone 5 weeks ago.  She had normal LFTs on 9/5/21 after one month of spironolactone 50mg. Feeling well. She thinks spironolactone did help.     HS Nurse Assessment    Nurse Assessment Data 6/3/2021 9/9/2021   Over the past month, about how many recurrent or new boils have you had? 11 7   Over the past week, how many dressing changes do you do each day? 1 1   Over the past week, has your wound drainage been: Moderate Moderate   Rate your HS overall from 0-10 (0 = no disease, 10 = worst) over the past week:  7 5   Rate your pain score from 0-10 (0 = no disease, 10 = worst) for the most painful/symptomatic lesion in the past week:  5 - Moderate Pain 6   Over the past week, how much has HS influenced your quality of life? very much moderately     Patient is otherwise feeling well, without additional skin concerns.    Medications:    Current Outpatient Medications   Medication     Acetaminophen 325 MG CAPS     albuterol (PROAIR HFA/PROVENTIL HFA/VENTOLIN HFA) 108 (90 Base) MCG/ACT inhaler     ALPRAZolam (XANAX) 0.25 MG tablet     amitriptyline (ELAVIL) 10 MG tablet     amLODIPine (NORVASC) 5 MG tablet     Ascorbic Acid (VITAMIN C) 500 MG CHEW     aspirin (ASA) 81 MG chewable tablet     busPIRone (BUSPAR) 10 MG tablet     colestipol (COLESTID) 1 g tablet     Cyanocobalamin 1000 MCG/15ML LIQD     EPINEPHrine (EPIPEN JR) 0.15 MG/0.3ML injection 2-pack     fish oil-omega-3 fatty acids 1000 MG capsule     folic acid (FOLVITE) 1 MG tablet      gabapentin (NEURONTIN) 300 MG capsule     ketoconazole (NIZORAL) 2 % external shampoo     levonorgestrel (MIRENA) 20 MCG/24HR IUD     loperamide (IMODIUM A-D) 2 MG tablet     losartan (COZAAR) 100 MG tablet     magnesium 250 MG tablet     multivitamin w/minerals (MULTI-VITAMIN) tablet     Saccharomyces boulardii (PROBIOTIC) 250 MG CAPS     simethicone (MYLICON) 80 MG chewable tablet     spironolactone (ALDACTONE) 50 MG tablet     venlafaxine (EFFEXOR-XR) 150 MG 24 hr capsule     Vitamin D, Cholecalciferol, 25 MCG (1000 UT) TABS     fluconazole (DIFLUCAN) 150 MG tablet     metFORMIN (GLUCOPHAGE) 500 MG tablet     Current Facility-Administered Medications   Medication     triamcinolone acetonide (KENALOG-10) injection 40 mg           Current Facility-Administered Medications   Medication     triamcinolone acetonide (KENALOG-10) injection 40 mg                 Physical Exam:    Abscess on rt thigh and pustule on rt thigh  Approx 4inflammatory nodules on left thigh    CC Referred Self, MD  No address on file on close of this encounter.

## 2021-09-09 NOTE — LETTER
9/9/2021       RE: Estephanie Brown  [address]  [place] [facility] [address]     Dear Colleague,    Thank you for referring your patient, Estephanie Brown, to the [facility]ERMATOLOGY [facility]INIC [place] at [facility]. Please see a copy of my visit note below.    [facility] Dermatology Note  Encounter Date: Sep 9, 2021  Store-and-Forward and Telephone ([phone] ). Location of teledermatologist: [facility] [place].  Start time: 8:15pm . End time: 8:37     Dermatology Problem List:  1. Hidradenitis suppurativa  2. Cystic acne chin  3. Seborrheic keratoses  4. Mirena in place  5. Crohns disease  - Previously treated with:                Humira - helped but stopped helping Crohn's so they stopped it              Enbrel  - did not help HS or Crohns              Stelara - did not help HS or Crohns              Azathioprine - Helped Crohns but not HS. Stopped azathioprine     because of elvated LFTs, but Crohns never flard back up  6. 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 Oral allergy syndrome  Family hx of colitis: Father, Maternal uncle  Patient also exists in our medical record with MRN: [id number] & [id number]     ____________________________________________     Assessment & Plan:   # HS  -  Doing OK. Just started spironolactone again and rechecking liver function in 3 weeks. If OK, will increase to 100mg daily and touch base in 12 weeks via phone to see if she wants to try something new. IBD has been flaring more. LFTs were >5x upper limit ofnormal. While I couldn't see her results this past week, she states LFTs wnl.  She notes oral ulcers and GI symptomsworse. I suspect crohns flaring. Would try golimumab next.     Follow-up: 12 weeks     Staff:     Caio Coronado MD, FAAD, FACP     Departments of Internal Medicine and Dermatology  [facility]  [place]  [phone]  ____________________________________________     CC: Follow-up     HPI:  Ms. Estephanie Brown is a(n) 46 year old female who presents today as a return patient for HS. The patient was last seen by myself on 6/3/21 at which time she was continued on spironolactone and metformin. We discussed adding Humira vs infliximab vs clinical trial, and elected to hold off for now. Additionally, 4 lesions were injected with ILK 10. In the mean time, she was seen by Dr. Paniagua, who did not think she was a good candidate for surgical excision at this time.       Lesions ar coming in new places. Extending to thigh and buttocks.    Started using pit liquor that is ETOH based and seems to be helping under arms.    She started metformin and spironolactone together and had to stop because of the liver. She restarted the spironolactone alone 5 weeks ago.  She had normal LFTs on 9/5/21 after one month of spironolactone 50mg. Feeling well. She thinks spironolactone did help.     HS Nurse Assessment    Nurse Assessment Data 6/3/2021 9/9/2021   Over the past month, about how many recurrent or new boils have you had? 11 7   Over the past week, how many dressing changes do you do each day? 1 1   Over the past week, has your wound drainage been: Moderate Moderate   Rate your HS overall from 0-10 (0 = no disease, 10 = worst) over the past week:  7 5   Rate your pain score from 0-10 (0 = no disease, 10 = worst) for the most painful/symptomatic lesion in the past week:  5 - Moderate Pain 6   Over the past week, how much has HS influenced your quality of life? very much moderately     Patient is otherwise feeling well, without additional skin concerns.    Medications:    Current Outpatient Medications   Medication     Acetaminophen 325 MG CAPS     albuterol (PROAIR HFA/PROVENTIL HFA/VENTOLIN HFA) 108 (90 Base) MCG/ACT inhaler     ALPRAZolam (XANAX) 0.25 MG tablet     amitriptyline (ELAVIL) 10 MG tablet     amLODIPine (NORVASC) 5  MG tablet     Ascorbic Acid (VITAMIN C) 500 MG CHEW     aspirin (ASA) 81 MG chewable tablet     busPIRone (BUSPAR) 10 MG tablet     colestipol (COLESTID) 1 g tablet     Cyanocobalamin 1000 MCG/15ML LIQD     EPINEPHrine (EPIPEN JR) 0.15 MG/0.3ML injection 2-pack     fish oil-omega-3 fatty acids 1000 MG capsule     folic acid (FOLVITE) 1 MG tablet     gabapentin (NEURONTIN) 300 MG capsule     ketoconazole (NIZORAL) 2 % external shampoo     levonorgestrel (MIRENA) 20 MCG/24HR IUD     loperamide (IMODIUM A-D) 2 MG tablet     losartan (COZAAR) 100 MG tablet     magnesium 250 MG tablet     multivitamin w/minerals (MULTI-VITAMIN) tablet     Saccharomyces boulardii (PROBIOTIC) 250 MG CAPS     simethicone (MYLICON) 80 MG chewable tablet     spironolactone (ALDACTONE) 50 MG tablet     venlafaxine (EFFEXOR-XR) 150 MG 24 hr capsule     Vitamin D, Cholecalciferol, 25 MCG (1000 UT) TABS     fluconazole (DIFLUCAN) 150 MG tablet     metFORMIN (GLUCOPHAGE) 500 MG tablet     Current Facility-Administered Medications   Medication     triamcinolone acetonide (KENALOG-10) injection 40 mg           Current Facility-Administered Medications   Medication     triamcinolone acetonide (KENALOG-10) injection 40 mg                 Physical Exam:    Abscess on rt thigh and pustule on rt thigh  Approx 4inflammatory nodules on left thigh    CC Referred Self, MD  No address on file on close of this encounter.

## 2021-09-09 NOTE — NURSING NOTE
Dermatology Rooming Note    Estephanie Brown's goals for this visit include:   Chief Complaint   Patient presents with     Derm Problem     meryl is having this visit today for a follow up on the HS, had liver reaction to medication, states that things have not been going well     Madie Hardwick CMA on 9/9/2021 at 3:57 PM

## 2021-09-28 DIAGNOSIS — L73.2 HIDRADENITIS SUPPURATIVA: ICD-10-CM

## 2021-09-30 RX ORDER — SPIRONOLACTONE 50 MG/1
50 TABLET, FILM COATED ORAL 2 TIMES DAILY
Qty: 180 TABLET | Refills: 0 | OUTPATIENT
Start: 2021-09-30

## 2021-09-30 NOTE — TELEPHONE ENCOUNTER
Spironolactone Oral Tablet 50 MG   spironolactone (ALDACTONE) 50 MG tablet 180 tablet 0 9/8/2021  No   Sig - Route: Take 1 tablet (50 mg) by mouth 2 times daily - Oral   Sent to pharmacy as: Spironolactone 50 MG Oral Tablet (ALDACTONE)   Class: E-Prescribe   Notes to Pharmacy: Needs blood work for futher refills.   Order: [id number]   E-Prescribing Status: Receipt confirmed by pharmacy (9/8/2021 10:08 AM CDT)       Printout Tracking    External Result Report     Pharmacy    [id number] [place] [facility] [address] [address] [facility]

## 2021-10-11 ENCOUNTER — HEALTH MAINTENANCE LETTER (OUTPATIENT)
Age: 46
End: 2021-10-11

## 2021-12-02 ENCOUNTER — VIRTUAL VISIT (OUTPATIENT)
Dept: DERMATOLOGY | Facility: CLINIC | Age: 46
End: 2021-12-02
Payer: COMMERCIAL

## 2021-12-02 DIAGNOSIS — L73.2 HIDRADENITIS SUPPURATIVA: Primary | ICD-10-CM

## 2021-12-02 PROCEDURE — 99214 OFFICE O/P EST MOD 30 MIN: CPT | Mod: 95 | Performed by: DERMATOLOGY

## 2021-12-02 RX ORDER — RESORCINOL
POWDER (GRAM) MISCELLANEOUS
Qty: 30 G | Refills: 3 | Status: SHIPPED | OUTPATIENT
Start: 2021-12-02 | End: 2021-12-03

## 2021-12-02 RX ORDER — RESORCINOL
POWDER (GRAM) MISCELLANEOUS
Qty: 30 G | Refills: 3 | Status: SHIPPED | OUTPATIENT
Start: 2021-12-02 | End: 2021-12-02

## 2021-12-02 NOTE — PATIENT INSTRUCTIONS
Continue spironolactone 100mg daily  Dietary changes: yeast and dairy restritction  Resorcinol cream: twice a day to chronic areas for 30 days and then twice a day a day as needed for flares (Sent to [facility])     Chemistry Rx  Resorcinol 15% in [facility]   30 g = $68  60g = $95   Phone #: [phone]

## 2021-12-02 NOTE — PROGRESS NOTES
[facility] Dermatology Note  Encounter Date: Dec 2, 2021  Store-and-Forward and Telephone ([phone]). Location of teledermatologist: [facility] DERMATOLOGY CLIN[facility] [place].  Start time: 3:20pm. End time: 3:53pm.    Dermatology Problem List:  1. Hidradenitis suppurativa  2. Cystic acne chin  3. Seborrheic keratoses  4. Mirena in place  5. Crohns disease  - Previously treated with:                Humira - helped but stopped helping Crohn's so they stopped it              Enbrel  - did not help HS or Crohns              Stelara - did not help HS or Crohns              Azathioprine - Helped Crohns but not HS. Stopped azathioprine      because of elvated LFTs, but Crohns never flard back up  6. 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 Oral allergy syndrome  Family hx of colitis: Father, Maternal uncle  Patient also exists in our medical record with MRN: [id number] & [id number]  ____________________________________________    Assessment & Plan:   # HS  Doing better on 100mg Spironolactone daily. Still with 6-7 active lesions today. Did get repeat LFTs through [facility] (visible in other chart), which normalized. It is reasonable to try dietary changes to see if she can get better control of HS.  - Continue spironolactone 100mg daily   - Try wheat and dairy free diet  - Try resorcinol cream bid for 1 mo and then bid prn     Follow-up: 12 week(s) virtually (telephone with photos), or earlier for new or changing lesions    Staff and Resident:     Scribe Disclosure:  I, Eva Slaughter, am serving as a scribe to document services personally performed by Caio Coronado MD based on data collection and the provider's statements to me.     Patient discussed with Dr. Coronado.    Jodi Hermosillo MD  [facility]  Medicine-Pediatrics, PGY-3    Staff Physician Comments:   I saw and evaluated the patient with the resident and I agree with the assessment and plan.  I  was present for the encouhnter.    Caio Coronado MD, FAAD    Departments of Internal Medicine and Dermatology  [facility]  [phone]      ____________________________________________    CC: Derm Problem (HS follow up, states that she has 6-7 active spots )    HPI:  Ms. Estephanie Brown is a(n) 46 year old female who presents today for follow-up  for HS. The patient was last seen by Dr. Coronado virtually on 9/9/21 at which point she was restarted on spironolactone with plans to increase to 100 mg daily pending normal liver function. Notably, at that time the patient reported worsening GI symptoms and oral ulcers, which made Dr. Coronado suspect a Crohn's flare.     HS- 6-7 active lesions currently. Less tunneling than before. Spironolactone is helping, but only started helping after many weeks of taking it. She is wondering about dietary changes that may help HS. She was told by an RD that avoiding yeast and dairy may help her symptoms.     Crohn's- Pretty active right now, but has started to calm down in last 2 weeks. She did not start any new medications during this flare and just endured her symptoms.     Elevated liver enzymes- Back in June had elevated liver studies. Saw an Hepatologist who suspects it was related to metformin. She stopped metformin and continued spironolactone and her liver function tests have normalized.     Patient is otherwise feeling well, without additional skin concerns.      HS Nurse Assessment    Nurse Assessment Data 6/3/2021 9/9/2021 12/2/2021   Over the past month, about how many recurrent or new boils have you had? 11 7 6-7   Over the past week, how many dressing changes do you do each day? 1 1 0   Over the past week, has your wound drainage been: Moderate Moderate Mild   Rate your HS overall from 0-10 (0 = no disease, 10 = worst) over the past week:  7 5 4   Rate your pain score from 0-10 (0 = no disease, 10 = worst) for the most painful/symptomatic  lesion in the past week:  5 - Moderate Pain 6 4   Over the past week, how much has HS influenced your quality of life? very much moderately moderately         Labs Reviewed:  LFTs (in [facility] chart 11/17): within normal limits    Physical Exam:  Vitals: There were no vitals taken for this visit.  SKIN: No photos provided this visit    Medications:  Current Outpatient Medications   Medication     Acetaminophen 325 MG CAPS     albuterol (PROAIR HFA/PROVENTIL HFA/VENTOLIN HFA) 108 (90 Base) MCG/ACT inhaler     ALPRAZolam (XANAX) 0.25 MG tablet     amitriptyline (ELAVIL) 10 MG tablet     amLODIPine (NORVASC) 5 MG tablet     Ascorbic Acid (VITAMIN C) 500 MG CHEW     aspirin (ASA) 81 MG chewable tablet     busPIRone (BUSPAR) 10 MG tablet     colestipol (COLESTID) 1 g tablet     Cyanocobalamin 1000 MCG/15ML LIQD     EPINEPHrine (EPIPEN JR) 0.15 MG/0.3ML injection 2-pack     fish oil-omega-3 fatty acids 1000 MG capsule     folic acid (FOLVITE) 1 MG tablet     gabapentin (NEURONTIN) 300 MG capsule     ketoconazole (NIZORAL) 2 % external shampoo     levonorgestrel (MIRENA) 20 MCG/24HR IUD     loperamide (IMODIUM A-D) 2 MG tablet     losartan (COZAAR) 100 MG tablet     magnesium 250 MG tablet     multivitamin w/minerals (MULTI-VITAMIN) tablet     Resorcinol POWD     Saccharomyces boulardii (PROBIOTIC) 250 MG CAPS     simethicone (MYLICON) 80 MG chewable tablet     spironolactone (ALDACTONE) 50 MG tablet     venlafaxine (EFFEXOR-XR) 150 MG 24 hr capsule     Vitamin D, Cholecalciferol, 25 MCG (1000 UT) TABS     fluconazole (DIFLUCAN) 150 MG tablet     metFORMIN (GLUCOPHAGE) 500 MG tablet     Current Facility-Administered Medications   Medication     triamcinolone acetonide (KENALOG-10) injection 40 mg      CC Referred Self  No address on file on close of this encounter.

## 2021-12-02 NOTE — LETTER
12/2/2021       RE: Estephanie Brown  [address]  [place] [facility] [address]     Dear Colleague,    Thank you for referring your patient, Estephanie Brown, to the [facility] DERMATOLOGY [facility]INIC [place] at [facility]. Please see a copy of my visit note below.    [facility] Dermatology Note  Encounter Date: Dec 2, 2021  Store-and-Forward and Telephone ([phone]). Location of teledermatologist: [facility]ERMAT[facility] [place].  Start time: 3:20pm. End time: 3:53pm.    Dermatology Problem List:  1. Hidradenitis suppurativa  2. Cystic acne chin  3. Seborrheic keratoses  4. Mirena in place  5. Crohns disease  - Previously treated with:                Humira - helped but stopped helping Crohn's so they stopped it              Enbrel  - did not help HS or Crohns              Stelara - did not help HS or Crohns              Azathioprine - Helped Crohns but not HS. Stopped azathioprine      because of elvated LFTs, but Crohns never flard back up  6. 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 Oral allergy syndrome  Family hx of colitis: Father, Maternal uncle  Patient also exists in our medical record with MRN: [id number] & [id number]  ____________________________________________    Assessment & Plan:   # HS  Doing better on 100mg Spironolactone daily. Still with 6-7 active lesions today. Did get repeat LFTs through [facility] (visible in other chart), which normalized. It is reasonable to try dietary changes to see if she can get better control of HS.  - Continue spironolactone 100mg daily   - Try wheat and dairy free diet  - Try resorcinol cream bid for 1 mo and then bid prn     Follow-up: 12 week(s) virtually (telephone with photos), or earlier for new or changing lesions    Staff and Resident:     Scribe Disclosure:  IEva, am serving as a scribe to document services personally performed by Caio Coronado MD  based on data collection and the provider's statements to me.     Patient discussed with Dr. Coronado.    Jodi Hermosillo MD  [facility]  Medicine-Pediatrics, PGY-3    ____________________________________________    CC: Derm Problem (HS follow up, states that she has 6-7 active spots )    HPI:  Ms. Estephanie Brown is a(n) 46 year old female who presents today for follow-up  for HS. The patient was last seen by Dr. Coronado virtually on 9/9/21 at which point she was restarted on spironolactone with plans to increase to 100 mg daily pending normal liver function. Notably, at that time the patient reported worsening GI symptoms and oral ulcers, which made Dr. Coronado suspect a Crohn's flare.     HS- 6-7 active lesions currently. Less tunneling than before. Spironolactone is helping, but only started helping after many weeks of taking it. She is wondering about dietary changes that may help HS. She was told by an RD that avoiding yeast and dairy may help her symptoms.     Crohn's- Pretty active right now, but has started to calm down in last 2 weeks. She did not start any new medications during this flare and just endured her symptoms.     Elevated liver enzymes- Back in June had elevated liver studies. Saw an Hepatologist who suspects it was related to metformin. She stopped metformin and continued spironolactone and her liver function tests have normalized.     Patient is otherwise feeling well, without additional skin concerns.      HS Nurse Assessment    Nurse Assessment Data 6/3/2021 9/9/2021 12/2/2021   Over the past month, about how many recurrent or new boils have you had? 11 7 6-7   Over the past week, how many dressing changes do you do each day? 1 1 0   Over the past week, has your wound drainage been: Moderate Moderate Mild   Rate your HS overall from 0-10 (0 = no disease, 10 = worst) over the past week:  7 5 4   Rate your pain score from 0-10 (0 = no disease, 10 = worst) for the most  painful/symptomatic lesion in the past week:  5 - Moderate Pain 6 4   Over the past week, how much has HS influenced your quality of life? very much moderately moderately         Labs Reviewed:  LFTs (in [facility] chart 11/17): within normal limits    Physical Exam:  Vitals: There were no vitals taken for this visit.  SKIN: No photos provided this visit    Medications:  Current Outpatient Medications   Medication     Acetaminophen 325 MG CAPS     albuterol (PROAIR HFA/PROVENTIL HFA/VENTOLIN HFA) 108 (90 Base) MCG/ACT inhaler     ALPRAZolam (XANAX) 0.25 MG tablet     amitriptyline (ELAVIL) 10 MG tablet     amLODIPine (NORVASC) 5 MG tablet     Ascorbic Acid (VITAMIN C) 500 MG CHEW     aspirin (ASA) 81 MG chewable tablet     busPIRone (BUSPAR) 10 MG tablet     colestipol (COLESTID) 1 g tablet     Cyanocobalamin 1000 MCG/15ML LIQD     EPINEPHrine (EPIPEN JR) 0.15 MG/0.3ML injection 2-pack     fish oil-omega-3 fatty acids 1000 MG capsule     folic acid (FOLVITE) 1 MG tablet     gabapentin (NEURONTIN) 300 MG capsule     ketoconazole (NIZORAL) 2 % external shampoo     levonorgestrel (MIRENA) 20 MCG/24HR IUD     loperamide (IMODIUM A-D) 2 MG tablet     losartan (COZAAR) 100 MG tablet     magnesium 250 MG tablet     multivitamin w/minerals (MULTI-VITAMIN) tablet     Resorcinol POWD     Saccharomyces boulardii (PROBIOTIC) 250 MG CAPS     simethicone (MYLICON) 80 MG chewable tablet     spironolactone (ALDACTONE) 50 MG tablet     venlafaxine (EFFEXOR-XR) 150 MG 24 hr capsule     Vitamin D, Cholecalciferol, 25 MCG (1000 UT) TABS     fluconazole (DIFLUCAN) 150 MG tablet     metFORMIN (GLUCOPHAGE) 500 MG tablet     Current Facility-Administered Medications   Medication     triamcinolone acetonide (KENALOG-10) injection 40 mg      Past Medical/Surgical History:   There is no problem list on file for this patient.    No past medical history on file.    CC Referred Self  No address on file on close of this encounter.        Again,  thank you for allowing me to participate in the care of your patient.      Sincerely,    Caio Coronado MD

## 2021-12-02 NOTE — LETTER
Date:December 15, 2021      Patient was self referred, no letter generated. Do not send.        [facility] Health Information

## 2021-12-03 ENCOUNTER — TELEPHONE (OUTPATIENT)
Dept: DERMATOLOGY | Facility: CLINIC | Age: 46
End: 2021-12-03
Payer: COMMERCIAL

## 2021-12-03 DIAGNOSIS — L73.2 HIDRADENITIS SUPPURATIVA: ICD-10-CM

## 2021-12-03 RX ORDER — RESORCINOL
POWDER (GRAM) MISCELLANEOUS
Qty: 30 G | Refills: 3 | Status: SHIPPED | OUTPATIENT
Start: 2021-12-03

## 2021-12-12 RX ORDER — RESORCINOL
POWDER (GRAM) MISCELLANEOUS
Qty: 30 G | Refills: 3 | Status: SHIPPED | OUTPATIENT
Start: 2021-12-12

## 2021-12-30 DIAGNOSIS — L73.2 HIDRADENITIS SUPPURATIVA: ICD-10-CM

## 2022-01-03 NOTE — TELEPHONE ENCOUNTER
"Spironolactone Oral Tablet 50 MG     Last Written Prescription Date:  9/8/21  Last Fill Quantity: 180,   # refills: 0  Last Office Visit : 12/2/21 [place]  Future Office visit:  3 months    Routing refill request to provider for review/approval because:    Cr, Na, K not found- cannot see [facility] labs  noted by Dr Coronado on 12/2/21 ( excerpted below).  CareEverywhere and media tab reviewed.    Dr Coronado 12/2/21\" LFTs (in [facility] chart 11/17): within normal limits  Doing better on 100mg Spironolactone daily. Still with 6-7 active lesions today. Did get repeat LFTs through [facility] (visible in other chart), which normalized. It is reasonable to try dietary changes to see if she can get better control of HS.  - Continue spironolactone 100mg daily   - Try wheat and dairy free diet  - Try resorcinol cream bid for 1 mo and then bid prn \"     "

## 2022-01-04 ENCOUNTER — MYC REFILL (OUTPATIENT)
Dept: DERMATOLOGY | Facility: CLINIC | Age: 47
End: 2022-01-04
Payer: COMMERCIAL

## 2022-01-04 ENCOUNTER — MYC MEDICAL ADVICE (OUTPATIENT)
Dept: DERMATOLOGY | Facility: CLINIC | Age: 47
End: 2022-01-04
Payer: COMMERCIAL

## 2022-01-04 DIAGNOSIS — L73.2 HIDRADENITIS SUPPURATIVA: ICD-10-CM

## 2022-01-04 RX ORDER — SPIRONOLACTONE 50 MG/1
TABLET, FILM COATED ORAL
Qty: 180 TABLET | Refills: 0 | OUTPATIENT
Start: 2022-01-04

## 2022-01-04 RX ORDER — SPIRONOLACTONE 50 MG/1
50 TABLET, FILM COATED ORAL 2 TIMES DAILY
Qty: 180 TABLET | Refills: 0 | Status: SHIPPED | OUTPATIENT
Start: 2022-01-04 | End: 2022-01-06

## 2022-01-04 NOTE — TELEPHONE ENCOUNTER
spironolactone (ALDACTONE) 50 MG tablet  Last Written Prescription Date: 9/8/21  Last Fill Quantity: 180,   # refills: 0  Last Office Visit :12/2/21  Future Office visit: none    Routing refill request to provider for review/approval because: creat, NA,K+. not found. I dont see lab orders.? per rf notation needs labs for further rfs.... thanks.

## 2022-01-04 NOTE — TELEPHONE ENCOUNTER
Received refill request for spironolactone as the resident on call. Reviewed patient's chart and attached communication. Patient last seen 12/2/21. RTC 12 weeks . After reviewing the medication list and assessment and plan from last visit, the refill request was accepted.    Denise Maguire PGY4  Dermatology Resident  pager      CC'ing Dr Coronado as FYI only

## 2022-01-06 RX ORDER — SPIRONOLACTONE 100 MG/1
100 TABLET, FILM COATED ORAL DAILY
Qty: 90 TABLET | Refills: 1 | Status: SHIPPED | OUTPATIENT
Start: 2022-01-06 | End: 2022-06-09

## 2022-01-06 RX ORDER — SPIRONOLACTONE 50 MG/1
100 TABLET, FILM COATED ORAL DAILY
Qty: 180 TABLET | Refills: 1 | Status: SHIPPED | OUTPATIENT
Start: 2022-01-06 | End: 2022-01-06

## 2022-03-03 ENCOUNTER — VIRTUAL VISIT (OUTPATIENT)
Dept: DERMATOLOGY | Facility: CLINIC | Age: 47
End: 2022-03-03
Payer: COMMERCIAL

## 2022-03-03 DIAGNOSIS — L73.2 HIDRADENITIS SUPPURATIVA: Primary | ICD-10-CM

## 2022-03-03 PROCEDURE — 99214 OFFICE O/P EST MOD 30 MIN: CPT | Mod: TEL | Performed by: DERMATOLOGY

## 2022-03-03 RX ORDER — SEMAGLUTIDE 0.25 MG/.5ML
INJECTION, SOLUTION SUBCUTANEOUS
COMMUNITY
Start: 2022-02-04

## 2022-03-03 NOTE — LETTER
Date:March 4, 2022      Provider requested that no letter be sent. Do not send.       [facility]

## 2022-03-03 NOTE — LETTER
3/3/2022       RE: Estephanie Brown  [address]  [place] [facility] [address]     Dear Colleague,    Thank you for referring your patient, Estephanie Brown, to the [facility] [place] at [facility]. Please see a copy of my visit note below.    [facility] Dermatology Note  Encounter Date: Mar 3, 2022  Store-and-Forward and Telephone ([phone]). Location of teledermatologist: [facility] [place].  Start time: 7:30. End time: 7;42    Dermatology Problem List:  1. Hidradenitis suppurativa  2. Cystic acne chin  3. Seborrheic keratoses  4. Mirena in place  5. Crohns disease  - Previously treated with:                Humira - helped but stopped helping Crohn's so they stopped it              Enbrel  - did not help HS or Crohns              Stelara - did not help HS or Crohns              Azathioprine - Helped Crohns but not HS. Stopped azathioprine      because of elvated LFTs, but Crohns never flard back up  6. 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 Oral allergy syndrome  Family hx of colitis: Father, Maternal uncle  Patient also exists in our medical record with MRN: [id number] & [id number]    ____________________________________________    Assessment & Plan:     # HS  - She thinks she is doing better on spironolactone 100mg daily  - Will try tumeric 500-1000mg BID  - Continue current diet: dariy, wheat and fermented foods  - Start resorcinol cream BID for 30 days and then twice a day as needed for flare  - Does not want to be more aggressive than this at this time    Follow-up: 3 months with     Staff:     Caio Coronado MD, FAAD, FACP     Departments of Internal Medicine and Dermatology  [facility]  [phone]    ____________________________________________    CC: Derm Problem (HS follow up, has been getting better with abelino)    HPI:  Ms. Estephanie E May is a(n) 46  year old female who presents today as a return patient for HS. The patient was last seen in dermatology on 12/2/21 by myself at which time she was continued on spironolactone 100mg daily and started on resorcinol cream for treatment of HS.    She thinks she is doing a little better. She has been on spironolactone 100mg daily   She reduced dairy and fermented products and wheat which she has found helpful.   She did try the resorcinol which she has not tried yet.    HS Nurse Assessment    Nurse Assessment Data 9/9/2021 12/2/2021 3/3/2022   Over the past 30 days how many old lesions flared back up? - - 6   Over the past 30 days how many new lesions did you get? 7 6-7 3   Over the past week, how many dressing changes do you do each day? 1 0 1   Over the past week, has your wound drainage been: Moderate Mild Mild   Rate your HS overall from 0 - 10 (0 = no disease, 10 = worst) over the past week:  5 4 3   Rate your pain score from 0 - 10 (0 = no disease, 10 = worst) for the most painful/symptomatic lesion in the past week:  6 4 4   Over the past week, how much has HS influenced your quality of life? moderately moderately moderately     Patient is otherwise feeling well, without additional skin concerns.      Physical Exam: None    Medications:  Current Outpatient Medications   Medication     Acetaminophen 325 MG CAPS     albuterol (PROAIR HFA/PROVENTIL HFA/VENTOLIN HFA) 108 (90 Base) MCG/ACT inhaler     ALPRAZolam (XANAX) 0.25 MG tablet     amitriptyline (ELAVIL) 10 MG tablet     amLODIPine (NORVASC) 5 MG tablet     Ascorbic Acid (VITAMIN C) 500 MG CHEW     aspirin (ASA) 81 MG chewable tablet     busPIRone (BUSPAR) 10 MG tablet     colestipol (COLESTID) 1 g tablet     Cyanocobalamin 1000 MCG/15ML LIQD     EPINEPHrine (EPIPEN JR) 0.15 MG/0.3ML injection 2-pack     fish oil-omega-3 fatty acids 1000 MG capsule     folic acid (FOLVITE) 1 MG tablet     gabapentin (NEURONTIN) 300 MG capsule     ketoconazole (NIZORAL) 2 %  external shampoo     levonorgestrel (MIRENA) 20 MCG/24HR IUD     loperamide (IMODIUM A-D) 2 MG tablet     losartan (COZAAR) 100 MG tablet     magnesium 250 MG tablet     multivitamin w/minerals (MULTI-VITAMIN) tablet     Resorcinol POWD     Resorcinol POWD     Saccharomyces boulardii (PROBIOTIC) 250 MG CAPS     simethicone (MYLICON) 80 MG chewable tablet     spironolactone (ALDACTONE) 100 MG tablet     venlafaxine (EFFEXOR-XR) 150 MG 24 hr capsule     Vitamin D, Cholecalciferol, 25 MCG (1000 UT) TABS     WEGOVY 0.25 MG/0.5ML SOAJ     fluconazole (DIFLUCAN) 150 MG tablet     metFORMIN (GLUCOPHAGE) 500 MG tablet     Current Facility-Administered Medications   Medication     triamcinolone acetonide (KENALOG-10) injection 40 mg      Past Medical/Surgical History:   There is no problem list on file for this patient.    No past medical history on file.    CC Referred Self  No address on file on close of this encounter.      Again, thank you for allowing me to participate in the care of your patient.      Sincerely,    Caio Coronado MD

## 2022-03-03 NOTE — PROGRESS NOTES
[facility] Dermatology Note  Encounter Date: Mar 3, 2022  Store-and-Forward and Telephone ([phone]). Location of teledermatologist: [facility] [place].  Start time: 7:30. End time: 7;42    Dermatology Problem List:  1. Hidradenitis suppurativa  2. Cystic acne chin  3. Seborrheic keratoses  4. Mirena in place  5. Crohns disease  - Previously treated with:                Humira - helped but stopped helping Crohn's so they stopped it              Enbrel  - did not help HS or Crohns              Stelara - did not help HS or Crohns              Azathioprine - Helped Crohns but not HS. Stopped azathioprine      because of elvated LFTs, but Crohns never flard back up  6. 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 Oral allergy syndrome  Family hx of colitis: Father, Maternal uncle  Patient also exists in our medical record with MRN: [id number] & [id number]    ____________________________________________    Assessment & Plan:     # HS  - She thinks she is doing better on spironolactone 100mg daily  - Will try tumeric 500-1000mg BID  - Continue current diet: dariy, wheat and fermented foods  - Start resorcinol cream BID for 30 days and then twice a day as needed for flare  - Does not want to be more aggressive than this at this time    Follow-up: 3 months with     Staff:     Caio Coronado MD, FAAD, FACP     Departments of Internal Medicine and Dermatology  [facility]  [phone]    ____________________________________________    CC: Derm Problem (HS follow up, has been getting better with abelino)    HPI:  Ms. Estephanie Brown is a(n) 46 year old female who presents today as a return patient for HS. The patient was last seen in dermatology on 12/2/21 by myself at which time she was continued on spironolactone 100mg daily and started on resorcinol cream for treatment of HS.    She thinks she is doing a little better. She has been on  spironolactone 100mg daily   She reduced dairy and fermented products and wheat which she has found helpful.   She did try the resorcinol which she has not tried yet.    HS Nurse Assessment    Nurse Assessment Data 9/9/2021 12/2/2021 3/3/2022   Over the past 30 days how many old lesions flared back up? - - 6   Over the past 30 days how many new lesions did you get? 7 6-7 3   Over the past week, how many dressing changes do you do each day? 1 0 1   Over the past week, has your wound drainage been: Moderate Mild Mild   Rate your HS overall from 0 - 10 (0 = no disease, 10 = worst) over the past week:  5 4 3   Rate your pain score from 0 - 10 (0 = no disease, 10 = worst) for the most painful/symptomatic lesion in the past week:  6 4 4   Over the past week, how much has HS influenced your quality of life? moderately moderately moderately     Patient is otherwise feeling well, without additional skin concerns.      Physical Exam: None    Medications:  Current Outpatient Medications   Medication     Acetaminophen 325 MG CAPS     albuterol (PROAIR HFA/PROVENTIL HFA/VENTOLIN HFA) 108 (90 Base) MCG/ACT inhaler     ALPRAZolam (XANAX) 0.25 MG tablet     amitriptyline (ELAVIL) 10 MG tablet     amLODIPine (NORVASC) 5 MG tablet     Ascorbic Acid (VITAMIN C) 500 MG CHEW     aspirin (ASA) 81 MG chewable tablet     busPIRone (BUSPAR) 10 MG tablet     colestipol (COLESTID) 1 g tablet     Cyanocobalamin 1000 MCG/15ML LIQD     EPINEPHrine (EPIPEN JR) 0.15 MG/0.3ML injection 2-pack     fish oil-omega-3 fatty acids 1000 MG capsule     folic acid (FOLVITE) 1 MG tablet     gabapentin (NEURONTIN) 300 MG capsule     ketoconazole (NIZORAL) 2 % external shampoo     levonorgestrel (MIRENA) 20 MCG/24HR IUD     loperamide (IMODIUM A-D) 2 MG tablet     losartan (COZAAR) 100 MG tablet     magnesium 250 MG tablet     multivitamin w/minerals (MULTI-VITAMIN) tablet     Resorcinol POWD     Resorcinol POWD     Saccharomyces boulardii (PROBIOTIC) 250 MG  CAPS     simethicone (MYLICON) 80 MG chewable tablet     spironolactone (ALDACTONE) 100 MG tablet     venlafaxine (EFFEXOR-XR) 150 MG 24 hr capsule     Vitamin D, Cholecalciferol, 25 MCG (1000 UT) TABS     WEGOVY 0.25 MG/0.5ML SOAJ     fluconazole (DIFLUCAN) 150 MG tablet     metFORMIN (GLUCOPHAGE) 500 MG tablet     Current Facility-Administered Medications   Medication     triamcinolone acetonide (KENALOG-10) injection 40 mg      Past Medical/Surgical History:   There is no problem list on file for this patient.    No past medical history on file.    CC Referred Self  No address on file on close of this encounter.

## 2022-03-03 NOTE — NURSING NOTE
Dermatology Rooming Note    Estephanie Brown's goals for this visit include:   Chief Complaint   Patient presents with     Derm Problem     HS follow up, has been getting better with abelino     Madie Hardwick CMA on 3/3/2022 at 3:29 PM

## 2022-03-04 NOTE — PATIENT INSTRUCTIONS
- Continue sprionolactone 100mg daily  - Try tumeric 500-1000mg BID  - Continue current diet  - Start resorcinol cream twice a day for 30 days and then twice a day as needed for flare

## 2022-03-07 ENCOUNTER — MYC MEDICAL ADVICE (OUTPATIENT)
Dept: DERMATOLOGY | Facility: CLINIC | Age: 47
End: 2022-03-07
Payer: COMMERCIAL

## 2022-03-10 ENCOUNTER — OFFICE VISIT (OUTPATIENT)
Dept: DERMATOLOGY | Facility: CLINIC | Age: 47
End: 2022-03-10
Payer: COMMERCIAL

## 2022-03-10 DIAGNOSIS — L73.2 HIDRADENITIS SUPPURATIVA: Primary | ICD-10-CM

## 2022-03-10 PROCEDURE — 11900 INJECT SKIN LESIONS </W 7: CPT | Mod: GC | Performed by: STUDENT IN AN ORGANIZED HEALTH CARE EDUCATION/TRAINING PROGRAM

## 2022-03-10 ASSESSMENT — PAIN SCALES - GENERAL: PAINLEVEL: MILD PAIN (3)

## 2022-03-10 NOTE — PROGRESS NOTES
[facility] Dermatology Note   Encounter Date: Mar 10, 2022  Office visit    Dermatology Problem List:  1. Hidradenitis suppurativa  2. Cystic acne chin  3. Seborrheic keratoses  4. Mirena in place  5. Crohns disease  - Previously treated with:                Humira - helped but stopped helping Crohn's so they stopped it              Enbrel  - did not help HS or Crohns              Stelara - did not help HS or Crohns              Azathioprine - Helped Crohns but not HS. Stopped azathioprine      because of elvated LFTs, but Crohns never flard back up  6. 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 Oral allergy syndrome  Family hx of colitis: Father, Maternal uncle  Patient also exists in our medical record with MRN: [id number] & [id number]    ___________________________________________    Assessment & Plan:    # Hidradenitis suppurativa  Condition is currently flaring. Will perform ILK today in clinic to active areas  - See procedure note below for ILK  - Continue with tumeric and resorcinol   - Continue with dietary modifications     Procedures Performed:  - Kenalog intralesional injection procedure note: after verbal consent and discussion of risks including but not limited to atrophy, pain, and bruising, time out was performed, patient positioned, area cleaned with alcohol, 1.0 ml Kenalog 10 mg/ml injected into 4 site on (1 into each: R inframammary fold, L inframammary fold, L buttocks, and R infragluteal fold) patient tolerated procedure well    Follow-up: at previously scheduled    Staff Involved:  Patient was seen and staffed with attending physician Dr. Cliff Marroquin MD  Med/Derm Resident PGY-4  P:[phone]    Staff Addendum:  Staff Physician Comments:   I saw and evaluated the patient with the resident and I agree with the assessment and plan.  I was present for the key portions of the above major procedure and examination.    Caio Coronado MD, FAAD  Assistant  Professor  Departments of Internal Medicine and Dermatology  [facility]  [phone]      ___________________________________________      CC: Derm Problem (ILK for HS flare, one on each breast and back of left thigh/ buttock)      HPI:  Ms. Estephanie Brown is a(n) 46 year old female who presents to clinic today for ILK for HS flares. Reports:  - has been having a flare since Monday  - Associated with pain, redness, and some drainage  - otherwise feeling well in usual state of health    Physical exam:  General: in no acute distress, well-developed, well-nourished  Skin:  - skin type: fair  - erythematous tender lesions affecting R inframammary fold, L inframammary fold, L buttocks, and R infragluteal fold (1 each)  - No other lesions of concern on areas examined.     Medications:  Current Outpatient Medications   Medication     Acetaminophen 325 MG CAPS     albuterol (PROAIR HFA/PROVENTIL HFA/VENTOLIN HFA) 108 (90 Base) MCG/ACT inhaler     ALPRAZolam (XANAX) 0.25 MG tablet     amitriptyline (ELAVIL) 10 MG tablet     amLODIPine (NORVASC) 5 MG tablet     Ascorbic Acid (VITAMIN C) 500 MG CHEW     aspirin (ASA) 81 MG chewable tablet     busPIRone (BUSPAR) 10 MG tablet     colestipol (COLESTID) 1 g tablet     Cyanocobalamin 1000 MCG/15ML LIQD     EPINEPHrine (EPIPEN JR) 0.15 MG/0.3ML injection 2-pack     fish oil-omega-3 fatty acids 1000 MG capsule     folic acid (FOLVITE) 1 MG tablet     gabapentin (NEURONTIN) 300 MG capsule     ketoconazole (NIZORAL) 2 % external shampoo     levonorgestrel (MIRENA) 20 MCG/24HR IUD     loperamide (IMODIUM A-D) 2 MG tablet     losartan (COZAAR) 100 MG tablet     magnesium 250 MG tablet     multivitamin w/minerals (MULTI-VITAMIN) tablet     Resorcinol POWD     Resorcinol POWD     Saccharomyces boulardii (PROBIOTIC) 250 MG CAPS     simethicone (MYLICON) 80 MG chewable tablet     spironolactone (ALDACTONE) 100 MG tablet     venlafaxine (EFFEXOR-XR) 150 MG 24 hr capsule     Vitamin  D, Cholecalciferol, 25 MCG (1000 UT) TABS     WEGOVY 0.25 MG/0.5ML SOAJ     fluconazole (DIFLUCAN) 150 MG tablet     metFORMIN (GLUCOPHAGE) 500 MG tablet     Current Facility-Administered Medications   Medication     triamcinolone acetonide (KENALOG-10) injection 40 mg        CC No referring provider defined for this encounter. on close of this encounter.

## 2022-03-10 NOTE — Clinical Note
3/10/2022       RE: Estephanie Brown  [address] [address]     Dear Colleague,    Thank you for referring your patient, Estephanie Brown, to the [facility] [place] at [facility]. Please see a copy of my visit note below.    [facility] Dermatology Note   Encounter Date: Mar 10, 2022  Office visit    Dermatology Problem List:    1. Hidradenitis suppurativa  2. Cystic acne chin  3. Seborrheic keratoses  4. Mirena in place  5. Crohns disease  - Previously treated with:                Humira - helped but stopped helping Crohn's so they stopped it              Enbrel  - did not help HS or Crohns              Stelara - did not help HS or Crohns              Azathioprine - Helped Crohns but not HS. Stopped azathioprine      because of elvated LFTs, but Crohns never flard back up  6. 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 Oral allergy syndrome  Family hx of colitis: Father, Maternal uncle  Patient also exists in our medical record with MRN: [id number] & [id number]    ___________________________________________    Assessment & Plan:    # Hidradenitis suppurativa  Condition is currently flaring. Will perform ILK today in clinic to active areas  - See procedure note below for ILK  - Continue with tumeric and resorcinol   - Continue with dietary modifications     Procedures Performed:  - Kenalog intralesional injection procedure note: after verbal consent and discussion of risks including but not limited to atrophy, pain, and bruising, time out was performed, patient positioned, area cleaned with alcohol, 1.0 ml Kenalog 10 mg/ml injected into 4 site on (1 into each: R inframammary fold, L inframammary fold, L buttocks, and R infragluteal fold) patient tolerated procedure well    Follow-up: at previously scheduled    Staff Involved:  Patient was seen and staffed with attending physician Dr. Cliff Marroquin,  MD  Med/Derm Resident PGY-4  P:[phone]    Staff Addendum:  ***  ___________________________________________      CC: Derm Problem (ILK for HS flare, one on each breast and back of left thigh/ buttock)      HPI:  Ms. Estephanie Brown is a(n) 46 year old female who presents to clinic today for ILK for HS flares. Reports:  - has been having a flare since Monday  - Associated with pain, redness, and some drainage  - otherwise feeling well in usual state of health    Physical exam:  General: in no acute distress, well-developed, well-nourished  Skin:  - skin type: fair  - erythematous tender lesions affecting R inframammary fold, L inframammary fold, L buttocks, and R infragluteal fold (1 each)  - No other lesions of concern on areas examined.     Medications:  Current Outpatient Medications   Medication     Acetaminophen 325 MG CAPS     albuterol (PROAIR HFA/PROVENTIL HFA/VENTOLIN HFA) 108 (90 Base) MCG/ACT inhaler     ALPRAZolam (XANAX) 0.25 MG tablet     amitriptyline (ELAVIL) 10 MG tablet     amLODIPine (NORVASC) 5 MG tablet     Ascorbic Acid (VITAMIN C) 500 MG CHEW     aspirin (ASA) 81 MG chewable tablet     busPIRone (BUSPAR) 10 MG tablet     colestipol (COLESTID) 1 g tablet     Cyanocobalamin 1000 MCG/15ML LIQD     EPINEPHrine (EPIPEN JR) 0.15 MG/0.3ML injection 2-pack     fish oil-omega-3 fatty acids 1000 MG capsule     folic acid (FOLVITE) 1 MG tablet     gabapentin (NEURONTIN) 300 MG capsule     ketoconazole (NIZORAL) 2 % external shampoo     levonorgestrel (MIRENA) 20 MCG/24HR IUD     loperamide (IMODIUM A-D) 2 MG tablet     losartan (COZAAR) 100 MG tablet     magnesium 250 MG tablet     multivitamin w/minerals (MULTI-VITAMIN) tablet     Resorcinol POWD     Resorcinol POWD     Saccharomyces boulardii (PROBIOTIC) 250 MG CAPS     simethicone (MYLICON) 80 MG chewable tablet     spironolactone (ALDACTONE) 100 MG tablet     venlafaxine (EFFEXOR-XR) 150 MG 24 hr capsule     Vitamin D, Cholecalciferol, 25 MCG (1000  UT) TABS     WEGOVY 0.25 MG/0.5ML SOAJ     fluconazole (DIFLUCAN) 150 MG tablet     metFORMIN (GLUCOPHAGE) 500 MG tablet     Current Facility-Administered Medications   Medication     triamcinolone acetonide (KENALOG-10) injection 40 mg      Past Medical History:   There is no problem list on file for this patient.    No past medical history on file.    CC No referring provider defined for this encounter. on close of this encounter.      Again, thank you for allowing me to participate in the care of your patient.      Sincerely,    Bert Marroquin MD

## 2022-03-10 NOTE — NURSING NOTE
Dermatology Rooming Note    Estephanie Brown's goals for this visit include:   Chief Complaint   Patient presents with     Derm Problem     ILK for HS flare, one on each breast and back of left thigh/ buttock     Madie Hardwick CMA on 3/10/2022 at 9:19 AM

## 2022-03-10 NOTE — LETTER
Date:March 26, 2022      Provider requested that no letter be sent. Do not send.       [facility]

## 2022-05-22 ENCOUNTER — HEALTH MAINTENANCE LETTER (OUTPATIENT)
Age: 47
End: 2022-05-22

## 2022-06-09 ENCOUNTER — VIRTUAL VISIT (OUTPATIENT)
Dept: DERMATOLOGY | Facility: CLINIC | Age: 47
End: 2022-06-09
Payer: COMMERCIAL

## 2022-06-09 DIAGNOSIS — L73.2 HIDRADENITIS SUPPURATIVA: ICD-10-CM

## 2022-06-09 PROCEDURE — 99214 OFFICE O/P EST MOD 30 MIN: CPT | Mod: 95 | Performed by: PHYSICIAN ASSISTANT

## 2022-06-09 RX ORDER — SPIRONOLACTONE 100 MG/1
100 TABLET, FILM COATED ORAL DAILY
Qty: 90 TABLET | Refills: 1 | Status: SHIPPED | OUTPATIENT
Start: 2022-06-09

## 2022-06-09 ASSESSMENT — PAIN SCALES - GENERAL: PAINLEVEL: NO PAIN (0)

## 2022-06-09 NOTE — NURSING NOTE
Dermatology Rooming Note    Estephanie Brown's goals for this visit include:   Chief Complaint   Patient presents with     Derm Problem     Elia has an appointment for an HS follow-up.     Ta Denny, EMT

## 2022-06-09 NOTE — LETTER
6/9/2022       RE: Estephanie Brown  [address] [address]     Dear Colleague,    Thank you for referring your patient, Estephanie Brown, to the [facility]ERMATOLOGY [facility] [place] at [facility]. Please see a copy of my visit note below.    [facility] Dermatology Note  Encounter Date: Jun 9, 2022  Telephone ([phone]). Location of teledermatologist: [facility]MATOLOG[facility] [place]. Length of call: 9min    Dermatology Problem List:  1. Hidradenitis suppurativa  - spironolactone 100mg daily, periodic ILK with flares, tumeric and resorcinol prn with flares  2. Cystic acne chin  3. Seborrheic keratoses  4. Mirena in place  5. Crohns disease  - Previously treated with:                Humira - helped but stopped helping Crohn's so they stopped it              Enbrel  - did not help HS or Crohns              Stelara - did not help HS or Crohns              Azathioprine - Helped Crohns but not HS. Stopped azathioprine      because of elvated LFTs, but Crohns never flard back up  6. 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 Oral allergy syndrome  Family hx of colitis: Father, Maternal uncle  Patient also exists in our medical record with MRN: [id number] & [id number]  ____________________________________________    Assessment & Plan:     # Hidradenitis suppurativa - never started the tumeric and has not used resorcinol recently. Has reduced dairy intake. Has had a flare up since March when she was here last for ILK. Otherwise relatively stable on spironolactone. .  - Continue spironolactone 100mg daily  -reviewed labs from 11/17/21 which were ordered by her PCP. BUN/Cr and K+ nml  - Continue with tumeric and resorcinol prn  - Continue with dietary modifications  - In future, ILK when needed    Procedures Performed:    None    Follow-up: 6 month(s) in-person, or earlier for new or changing lesions    Staff and Scribe:      Scribe Disclosure:  I, Umesh Jorden, am serving as a scribe to document services personally performed by Leandra Cross PA-C based on data collection and the provider's statements to me.     Provider Disclosure:   The documentation recorded by the scribe accurately reflects the services I personally performed and the decisions made by me.    All risks, benefits and alternatives were discussed with patient.  Patient is in agreement and understands the assessment and plan.  All questions were answered.    Leandra Cross PA-C, [facility]  [facility] [place]: Phone: [phone], Fax: [phone]  [facility]: Phone: [phone],  Fax: [phone]  [facility]: Phone: [phone], Fax: [phone]  ____________________________________________    CC: No chief complaint on file.    HPI:  Ms. Estephanie Bronw is a(n) 47 year old female who presents today as a return patient for HS. Last seen in dermatology by Dr. Coronado on 3/10/2022, at which time patient underwent ILK injections for treatment of HS.    Today, notes she is doing well. The spironolactone has helped tremendously. She is tolerating it well with no noted side effects - denies lightheadedness, dizziness, heart palpitations, changes in urinary frequency, & menstrual changes. She has had a flare up since she was here in march, but expresses some frustration with trying to get in for an injection.     Patient is otherwise feeling well, without additional skin concerns.    Labs Reviewed:  BMP reviewed from 11/17/21 (in patient's other chart under MRN [id number]), BUN/Cr and K+ wnl    Physical Exam:  Vitals: There were no vitals taken for this visit.  SKIN: Teledermatology photos were reviewed; image quality and interpretability: acceptable. Image date: 6/9/22.  - scarring noted on bilateral axilla, no active papules or draining tracts noted. One  hyperpigmented papule on the medial thigh  - No other lesions of concern on areas examined.     Medications:  Current Outpatient Medications   Medication     Acetaminophen 325 MG CAPS     albuterol (PROAIR HFA/PROVENTIL HFA/VENTOLIN HFA) 108 (90 Base) MCG/ACT inhaler     ALPRAZolam (XANAX) 0.25 MG tablet     amitriptyline (ELAVIL) 10 MG tablet     amLODIPine (NORVASC) 5 MG tablet     Ascorbic Acid (VITAMIN C) 500 MG CHEW     aspirin (ASA) 81 MG chewable tablet     busPIRone (BUSPAR) 10 MG tablet     colestipol (COLESTID) 1 g tablet     Cyanocobalamin 1000 MCG/15ML LIQD     EPINEPHrine (EPIPEN JR) 0.15 MG/0.3ML injection 2-pack     fish oil-omega-3 fatty acids 1000 MG capsule     fluconazole (DIFLUCAN) 150 MG tablet     folic acid (FOLVITE) 1 MG tablet     gabapentin (NEURONTIN) 300 MG capsule     ketoconazole (NIZORAL) 2 % external shampoo     levonorgestrel (MIRENA) 20 MCG/24HR IUD     loperamide (IMODIUM A-D) 2 MG tablet     losartan (COZAAR) 100 MG tablet     magnesium 250 MG tablet     metFORMIN (GLUCOPHAGE) 500 MG tablet     multivitamin w/minerals (MULTI-VITAMIN) tablet     Resorcinol POWD     Resorcinol POWD     Saccharomyces boulardii (PROBIOTIC) 250 MG CAPS     simethicone (MYLICON) 80 MG chewable tablet     spironolactone (ALDACTONE) 100 MG tablet     venlafaxine (EFFEXOR-XR) 150 MG 24 hr capsule     Vitamin D, Cholecalciferol, 25 MCG (1000 UT) TABS     WEGOVY 0.25 MG/0.5ML SOAJ     Current Facility-Administered Medications   Medication     triamcinolone acetonide (KENALOG-10) injection 40 mg      Past Medical/Surgical History:   There is no problem list on file for this patient.    No past medical history on file.                          Again, thank you for allowing me to participate in the care of your patient.      Sincerely,    Leandra Cross PA-C

## 2022-06-09 NOTE — LETTER
Date:Jamee 10, 2022      Provider requested that no letter be sent. Do not send.       [facility]

## 2022-06-09 NOTE — PATIENT INSTRUCTIONS
[facility] Dermatology Visit    Thank you for allowing us to participate in your care. Your findings, instructions and follow-up plan are as follows:         When should I call my doctor?  If you are worsening or not improving, please, contact us or seek urgent care as noted below.     Who should I call with questions (adults)?  [facility] (adult and pediatric): [phone]  [facility] (adult): [phone]  For urgent needs outside of business hours call the [facility] at [phone] and ask for the dermatology resident on call  If this is a medical emergency and you are unable to reach an ER, Call 911    Who should I call with questions (pediatric)?  [facility]- Pediatric Dermatology  Dr. Niki Whitney, Dr. Sherwin Valle, Dr. Maru Villegas, Alexandria Herrera, PA  Dr. Lalitha Miller, Dr. Elizabeth More & Dr. Davey Garcia  Non Urgent  Nurse Triage Line; [phone]- Jayla and Stephanie RN Care Coordinators   [place] (/Complex ) [phone]    If you need a prescription refill, please contact your pharmacy. Refills are approved or denied by our physicians during normal business hours, Monday through Fridays  Per office policy, refills will not be granted if you have not been seen within the past year (or sooner depending on your child's condition).    Scheduling Information:  Pediatric Appointment Scheduling and Call Center [phone]  Radiology Scheduling- [phone]  Sedation Unit Scheduling- [phone]  [place] Scheduling- General [phone]; Pediatric Dermatology [phone]  Main  Services: [phone]  Singaporean: [phone]  Malaysian: [phone]  Hmong/Bryan/Sri Lankan: [phone]  Preadmission Nursing Department Fax Number: [phone] (fax all pre-operative paperwork to this number)    For urgent matters arising during evenings, weekends, or  holidays that cannot wait for normal business hours please call [phone] and ask for the dermatology resident on call to be paged.

## 2022-06-09 NOTE — PROGRESS NOTES
[facility] Dermatology Note  Encounter Date: Jun 9, 2022  Telephone ([phone]). Location of teledermatologist: [facility] [place]. Length of call: 9min    Dermatology Problem List:  1. Hidradenitis suppurativa  - spironolactone 100mg daily, periodic ILK with flares, tumeric and resorcinol prn with flares  2. Cystic acne chin  3. Seborrheic keratoses  4. Mirena in place  5. Crohns disease  - Previously treated with:                Humira - helped but stopped helping Crohn's so they stopped it              Enbrel  - did not help HS or Crohns              Stelara - did not help HS or Crohns              Azathioprine - Helped Crohns but not HS. Stopped azathioprine      because of elvated LFTs, but Crohns never flard back up  6. 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 Oral allergy syndrome  Family hx of colitis: Father, Maternal uncle  Patient also exists in our medical record with MRN: [id number] & [id number]  ____________________________________________    Assessment & Plan:     # Hidradenitis suppurativa - never started the tumeric and has not used resorcinol recently. Has reduced dairy intake. Has had a flare up since March when she was here last for ILK. Otherwise relatively stable on spironolactone. .  - Continue spironolactone 100mg daily  -reviewed labs from 11/17/21 which were ordered by her PCP. BUN/Cr and K+ nml  - Continue with tumeric and resorcinol prn  - Continue with dietary modifications  - In future, ILK when needed    Procedures Performed:    None    Follow-up: 6 month(s) in-person, or earlier for new or changing lesions    Staff and Scribe:     Scribe Disclosure:  MICHAEL, Umesh Leonardo, am serving as a scribe to document services personally performed by Leandra Cross PA-C based on data collection and the provider's statements to me.     Provider Disclosure:   The documentation recorded by the scribe accurately reflects the services I  personally performed and the decisions made by me.    All risks, benefits and alternatives were discussed with patient.  Patient is in agreement and understands the assessment and plan.  All questions were answered.    Leandra Cross PA-C, MPAS  [facility] [place]: Phone: [phone], Fax: [phone]  [facility]: Phone: [phone],  Fax: [phone]  [facility]: Phone: [phone], Fax: [phone]  ____________________________________________    CC: No chief complaint on file.    HPI:  Ms. Estephanie Brown is a(n) 47 year old female who presents today as a return patient for HS. Last seen in dermatology by Dr. Coronado on 3/10/2022, at which time patient underwent ILK injections for treatment of HS.    Today, notes she is doing well. The spironolactone has helped tremendously. She is tolerating it well with no noted side effects - denies lightheadedness, dizziness, heart palpitations, changes in urinary frequency, & menstrual changes. She has had a flare up since she was here in march, but expresses some frustration with trying to get in for an injection.     Patient is otherwise feeling well, without additional skin concerns.    Labs Reviewed:  BMP reviewed from 11/17/21 (in patient's other chart under MRN [id number]), BUN/Cr and K+ wnl    Physical Exam:  Vitals: There were no vitals taken for this visit.  SKIN: Teledermatology photos were reviewed; image quality and interpretability: acceptable. Image date: 6/9/22.  - scarring noted on bilateral axilla, no active papules or draining tracts noted. One hyperpigmented papule on the medial thigh  - No other lesions of concern on areas examined.     Medications:  Current Outpatient Medications   Medication     Acetaminophen 325 MG CAPS     albuterol (PROAIR HFA/PROVENTIL HFA/VENTOLIN HFA) 108 (90 Base) MCG/ACT inhaler     ALPRAZolam (XANAX) 0.25 MG tablet      amitriptyline (ELAVIL) 10 MG tablet     amLODIPine (NORVASC) 5 MG tablet     Ascorbic Acid (VITAMIN C) 500 MG CHEW     aspirin (ASA) 81 MG chewable tablet     busPIRone (BUSPAR) 10 MG tablet     colestipol (COLESTID) 1 g tablet     Cyanocobalamin 1000 MCG/15ML LIQD     EPINEPHrine (EPIPEN JR) 0.15 MG/0.3ML injection 2-pack     fish oil-omega-3 fatty acids 1000 MG capsule     fluconazole (DIFLUCAN) 150 MG tablet     folic acid (FOLVITE) 1 MG tablet     gabapentin (NEURONTIN) 300 MG capsule     ketoconazole (NIZORAL) 2 % external shampoo     levonorgestrel (MIRENA) 20 MCG/24HR IUD     loperamide (IMODIUM A-D) 2 MG tablet     losartan (COZAAR) 100 MG tablet     magnesium 250 MG tablet     metFORMIN (GLUCOPHAGE) 500 MG tablet     multivitamin w/minerals (MULTI-VITAMIN) tablet     Resorcinol POWD     Resorcinol POWD     Saccharomyces boulardii (PROBIOTIC) 250 MG CAPS     simethicone (MYLICON) 80 MG chewable tablet     spironolactone (ALDACTONE) 100 MG tablet     venlafaxine (EFFEXOR-XR) 150 MG 24 hr capsule     Vitamin D, Cholecalciferol, 25 MCG (1000 UT) TABS     WEGOVY 0.25 MG/0.5ML SOAJ     Current Facility-Administered Medications   Medication     triamcinolone acetonide (KENALOG-10) injection 40 mg      Past Medical/Surgical History:   There is no problem list on file for this patient.    No past medical history on file.

## 2022-06-22 ENCOUNTER — HOSPITAL ENCOUNTER (EMERGENCY)
Facility: CLINIC | Age: 47
Discharge: HOME | End: 2022-06-22
Attending: EMERGENCY MEDICINE | Admitting: EMERGENCY MEDICINE
Payer: COMMERCIAL

## 2022-06-22 ENCOUNTER — TRANSFERRED RECORDS (OUTPATIENT)
Dept: HEALTH INFORMATION MANAGEMENT | Facility: CLINIC | Age: 47
End: 2022-06-22

## 2022-06-22 ENCOUNTER — APPOINTMENT (OUTPATIENT)
Dept: CT IMAGING | Facility: CLINIC | Age: 47
End: 2022-06-22
Attending: EMERGENCY MEDICINE
Payer: COMMERCIAL

## 2022-06-22 VITALS
HEART RATE: 114 BPM | RESPIRATION RATE: 20 BRPM | OXYGEN SATURATION: 96 % | DIASTOLIC BLOOD PRESSURE: 92 MMHG | SYSTOLIC BLOOD PRESSURE: 122 MMHG | TEMPERATURE: 98.7 F

## 2022-06-22 DIAGNOSIS — R10.9 ACUTE ABDOMINAL PAIN: ICD-10-CM

## 2022-06-22 DIAGNOSIS — R19.7 DIARRHEA, UNSPECIFIED TYPE: ICD-10-CM

## 2022-06-22 DIAGNOSIS — K50.919 CROHN'S DISEASE WITH COMPLICATION, UNSPECIFIED GASTROINTESTINAL TRACT LOCATION (H): ICD-10-CM

## 2022-06-22 LAB
ALBUMIN SERPL-MCNC: 3.5 G/DL (ref 3.4–5)
ALBUMIN UR-MCNC: NEGATIVE MG/DL
ALP SERPL-CCNC: 76 U/L (ref 40–150)
ALT SERPL W P-5'-P-CCNC: 42 U/L (ref 0–50)
ANION GAP SERPL CALCULATED.3IONS-SCNC: 6 MMOL/L (ref 3–14)
APPEARANCE UR: CLEAR
AST SERPL W P-5'-P-CCNC: 18 U/L (ref 0–45)
ATRIAL RATE - MUSE: 90 BPM
BASOPHILS # BLD AUTO: 0 10E3/UL (ref 0–0.2)
BASOPHILS NFR BLD AUTO: 0 %
BILIRUB SERPL-MCNC: 0.4 MG/DL (ref 0.2–1.3)
BILIRUB UR QL STRIP: NEGATIVE
BUN SERPL-MCNC: 8 MG/DL (ref 7–30)
C COLI+JEJUNI+LARI FUSA STL QL NAA+PROBE: NOT DETECTED
C DIFF TOX B STL QL: NEGATIVE
CALCIUM SERPL-MCNC: 9 MG/DL (ref 8.5–10.1)
CHLORIDE BLD-SCNC: 105 MMOL/L (ref 94–109)
CO2 SERPL-SCNC: 27 MMOL/L (ref 20–32)
COLOR UR AUTO: ABNORMAL
CREAT SERPL-MCNC: 0.71 MG/DL (ref 0.52–1.04)
DIASTOLIC BLOOD PRESSURE - MUSE: NORMAL MMHG
EC STX1 GENE STL QL NAA+PROBE: NOT DETECTED
EC STX2 GENE STL QL NAA+PROBE: NOT DETECTED
EOSINOPHIL # BLD AUTO: 0.2 10E3/UL (ref 0–0.7)
EOSINOPHIL NFR BLD AUTO: 1 %
ERYTHROCYTE [DISTWIDTH] IN BLOOD BY AUTOMATED COUNT: 12.6 % (ref 10–15)
GFR SERPL CREATININE-BSD FRML MDRD: >90 ML/MIN/1.73M2
GLUCOSE BLD-MCNC: 88 MG/DL (ref 70–99)
GLUCOSE UR STRIP-MCNC: NEGATIVE MG/DL
HCG SER QL IA.RAPID: NEGATIVE
HCT VFR BLD AUTO: 43.8 % (ref 35–47)
HGB BLD-MCNC: 14.4 G/DL (ref 11.7–15.7)
HGB UR QL STRIP: ABNORMAL
IMM GRANULOCYTES # BLD: 0 10E3/UL
IMM GRANULOCYTES NFR BLD: 0 %
INTERPRETATION ECG - MUSE: NORMAL
KETONES UR STRIP-MCNC: NEGATIVE MG/DL
LEUKOCYTE ESTERASE UR QL STRIP: NEGATIVE
LIPASE SERPL-CCNC: 71 U/L (ref 73–393)
LYMPHOCYTES # BLD AUTO: 2.1 10E3/UL (ref 0.8–5.3)
LYMPHOCYTES NFR BLD AUTO: 17 %
MCH RBC QN AUTO: 30.8 PG (ref 26.5–33)
MCHC RBC AUTO-ENTMCNC: 32.9 G/DL (ref 31.5–36.5)
MCV RBC AUTO: 94 FL (ref 78–100)
MONOCYTES # BLD AUTO: 0.9 10E3/UL (ref 0–1.3)
MONOCYTES NFR BLD AUTO: 7 %
NEUTROPHILS # BLD AUTO: 9 10E3/UL (ref 1.6–8.3)
NEUTROPHILS NFR BLD AUTO: 75 %
NITRATE UR QL: NEGATIVE
NOROV GI+II ORF1-ORF2 JNC STL QL NAA+PR: NOT DETECTED
NRBC # BLD AUTO: 0 10E3/UL
NRBC BLD AUTO-RTO: 0 /100
P AXIS - MUSE: 57 DEGREES
PH UR STRIP: 6 [PH] (ref 5–7)
PLATELET # BLD AUTO: 346 10E3/UL (ref 150–450)
POTASSIUM BLD-SCNC: 3.6 MMOL/L (ref 3.4–5.3)
PR INTERVAL - MUSE: 132 MS
PROT SERPL-MCNC: 7.8 G/DL (ref 6.8–8.8)
QRS DURATION - MUSE: 88 MS
QT - MUSE: 360 MS
QTC - MUSE: 440 MS
R AXIS - MUSE: 6 DEGREES
RBC # BLD AUTO: 4.67 10E6/UL (ref 3.8–5.2)
RBC URINE: <1 /HPF
RVA NSP5 STL QL NAA+PROBE: NOT DETECTED
SALMONELLA SP RPOD STL QL NAA+PROBE: NOT DETECTED
SHIGELLA SP+EIEC IPAH STL QL NAA+PROBE: NOT DETECTED
SODIUM SERPL-SCNC: 138 MMOL/L (ref 133–144)
SP GR UR STRIP: 1.03 (ref 1–1.03)
SQUAMOUS EPITHELIAL: 1 /HPF
SYSTOLIC BLOOD PRESSURE - MUSE: NORMAL MMHG
T AXIS - MUSE: 50 DEGREES
UROBILINOGEN UR STRIP-MCNC: NORMAL MG/DL
V CHOL+PARA RFBL+TRKH+TNAA STL QL NAA+PR: NOT DETECTED
VENTRICULAR RATE- MUSE: 90 BPM
WBC # BLD AUTO: 12.2 10E3/UL (ref 4–11)
WBC URINE: 1 /HPF
Y ENTERO RECN STL QL NAA+PROBE: NOT DETECTED

## 2022-06-22 PROCEDURE — 87493 C DIFF AMPLIFIED PROBE: CPT | Performed by: EMERGENCY MEDICINE

## 2022-06-22 PROCEDURE — 74177 CT ABD & PELVIS W/CONTRAST: CPT

## 2022-06-22 PROCEDURE — 99285 EMERGENCY DEPT VISIT HI MDM: CPT | Mod: 25

## 2022-06-22 PROCEDURE — 250N000011 HC RX IP 250 OP 636: Performed by: EMERGENCY MEDICINE

## 2022-06-22 PROCEDURE — 84702 CHORIONIC GONADOTROPIN TEST: CPT

## 2022-06-22 PROCEDURE — 80053 COMPREHEN METABOLIC PANEL: CPT | Performed by: EMERGENCY MEDICINE

## 2022-06-22 PROCEDURE — 258N000003 HC RX IP 258 OP 636: Performed by: EMERGENCY MEDICINE

## 2022-06-22 PROCEDURE — 81001 URINALYSIS AUTO W/SCOPE: CPT | Performed by: EMERGENCY MEDICINE

## 2022-06-22 PROCEDURE — 93005 ELECTROCARDIOGRAM TRACING: CPT

## 2022-06-22 PROCEDURE — 85025 COMPLETE CBC W/AUTO DIFF WBC: CPT | Performed by: EMERGENCY MEDICINE

## 2022-06-22 PROCEDURE — 83690 ASSAY OF LIPASE: CPT | Performed by: EMERGENCY MEDICINE

## 2022-06-22 PROCEDURE — 250N000009 HC RX 250: Performed by: EMERGENCY MEDICINE

## 2022-06-22 PROCEDURE — 96374 THER/PROPH/DIAG INJ IV PUSH: CPT | Mod: 59

## 2022-06-22 PROCEDURE — 36415 COLL VENOUS BLD VENIPUNCTURE: CPT | Performed by: EMERGENCY MEDICINE

## 2022-06-22 PROCEDURE — 87506 IADNA-DNA/RNA PROBE TQ 6-11: CPT | Performed by: EMERGENCY MEDICINE

## 2022-06-22 RX ORDER — KETOROLAC TROMETHAMINE 15 MG/ML
10 INJECTION, SOLUTION INTRAMUSCULAR; INTRAVENOUS ONCE
Status: COMPLETED | OUTPATIENT
Start: 2022-06-22 | End: 2022-06-22

## 2022-06-22 RX ORDER — SCOLOPAMINE TRANSDERMAL SYSTEM 1 MG/1
1 PATCH, EXTENDED RELEASE TRANSDERMAL ONCE
Status: DISCONTINUED | OUTPATIENT
Start: 2022-06-22 | End: 2022-06-22 | Stop reason: HOSPADM

## 2022-06-22 RX ORDER — DIPHENHYDRAMINE HYDROCHLORIDE 50 MG/ML
INJECTION INTRAMUSCULAR; INTRAVENOUS
Status: DISCONTINUED
Start: 2022-06-22 | End: 2022-06-22 | Stop reason: HOSPADM

## 2022-06-22 RX ORDER — METOCLOPRAMIDE HYDROCHLORIDE 5 MG/ML
INJECTION INTRAMUSCULAR; INTRAVENOUS
Status: DISCONTINUED
Start: 2022-06-22 | End: 2022-06-22 | Stop reason: HOSPADM

## 2022-06-22 RX ORDER — HYDROMORPHONE HYDROCHLORIDE 1 MG/ML
INJECTION, SOLUTION INTRAMUSCULAR; INTRAVENOUS; SUBCUTANEOUS
Status: DISCONTINUED
Start: 2022-06-22 | End: 2022-06-22 | Stop reason: HOSPADM

## 2022-06-22 RX ORDER — IOPAMIDOL 755 MG/ML
500 INJECTION, SOLUTION INTRAVASCULAR ONCE
Status: COMPLETED | OUTPATIENT
Start: 2022-06-22 | End: 2022-06-22

## 2022-06-22 RX ADMIN — SODIUM CHLORIDE 65 ML: 9 INJECTION, SOLUTION INTRAVENOUS at 05:51

## 2022-06-22 RX ADMIN — SODIUM CHLORIDE 1000 ML: 9 INJECTION, SOLUTION INTRAVENOUS at 01:58

## 2022-06-22 RX ADMIN — IOPAMIDOL 60 ML: 755 INJECTION, SOLUTION INTRAVENOUS at 05:51

## 2022-06-22 RX ADMIN — SCOPALAMINE 1 PATCH: 1 PATCH, EXTENDED RELEASE TRANSDERMAL at 01:59

## 2022-06-22 RX ADMIN — KETOROLAC TROMETHAMINE 10 MG: 15 INJECTION, SOLUTION INTRAMUSCULAR; INTRAVENOUS at 01:59

## 2022-06-22 ASSESSMENT — ENCOUNTER SYMPTOMS
BLOOD IN STOOL: 0
DIARRHEA: 1
SORE THROAT: 1
ABDOMINAL PAIN: 1
NAUSEA: 1
VOMITING: 1

## 2022-06-22 NOTE — ED TRIAGE NOTES
Here for concern of watery diarrhea and nausea started yesterday, worse today associated with abdominal pain and sore throat. History of crohns. ABCs intact.      Triage Assessment     Row Name 06/22/22 0134       Triage Assessment (Adult)    Airway WDL WDL       Respiratory WDL    Respiratory WDL WDL       Cardiac WDL    Cardiac WDL WDL               

## 2022-06-22 NOTE — ED PROVIDER NOTES
History   Chief Complaint:  Diarrhea       The history is provided by the patient.      Estephanie Brown is a 47 year old female with history of Crohn's disease who presents with abdominal pain that began seven days ago and has now included emesis as of yesterday. Patient reports mucus in stool and diarrhea. Patient shares that she was seen earlier today for a sore throat and was given a COVID PCR test- results pending. Patient takes colestipol 3 MG daily. She denies use of immunosuppressants, recent complications, bloody stool, and recent antibiotic use.     Review of Systems   HENT: Positive for sore throat.    Gastrointestinal: Positive for abdominal pain, diarrhea, nausea and vomiting. Negative for blood in stool.   All other systems reviewed and are negative.      Allergies:  Birch Trees  Mangifera Indica Fruit Ext (Hemant)    Morphine  Skin Adhesives  Latex    Medications:  Acetaminophen 325 MG CAPS  albuterol (PROAIR HFA/PROVENTIL HFA/VENTOLIN HFA) 108 (90 Base) MCG/ACT inhaler  ALPRAZolam (XANAX) 0.25 MG tablet  amitriptyline (ELAVIL) 10 MG tablet  amLODIPine (NORVASC) 5 MG tablet  Ascorbic Acid (VITAMIN C) 500 MG CHEW  aspirin (ASA) 81 MG chewable tablet  busPIRone (BUSPAR) 10 MG tablet  colestipol (COLESTID) 1 g tablet  Cyanocobalamin 1000 MCG/15ML LIQD  EPINEPHrine (EPIPEN JR) 0.15 MG/0.3ML injection 2-pack  fish oil-omega-3 fatty acids 1000 MG capsule  fluconazole (DIFLUCAN) 150 MG tablet  folic acid (FOLVITE) 1 MG tablet  gabapentin (NEURONTIN) 300 MG capsule  ketoconazole (NIZORAL) 2 % external shampoo  levonorgestrel (MIRENA) 20 MCG/24HR IUD  loperamide (IMODIUM A-D) 2 MG tablet  losartan (COZAAR) 100 MG tablet  magnesium 250 MG tablet  metFORMIN (GLUCOPHAGE) 500 MG tablet  multivitamin w/minerals (THERA-VIT-M) tablet  Resorcinol POWD  Resorcinol POWD  Saccharomyces boulardii (PROBIOTIC) 250 MG CAPS  simethicone (MYLICON) 80 MG chewable tablet  spironolactone (ALDACTONE) 100 MG tablet  venlafaxine  (EFFEXOR-XR) 150 MG 24 hr capsule  Vitamin D, Cholecalciferol, 25 MCG (1000 UT) TABS    Pertinent Past Medical History:     Chron's disease      Family History:    Skin cancer     Social History:  The patient arrived to ED by private vehicle.   GI through [facility].     Physical Exam     Patient Vitals for the past 24 hrs:   BP Temp Temp src Pulse Resp SpO2   06/22/22 0136 (!) 122/92 98.7  F (37.1  C) Oral 114 20 96 %       Physical Exam  Constitutional:  Oriented to person, place, and time. Well appearing.   HENT:   Head:    Normocephalic.   Mouth/Throat:   Oropharynx is clear and moist.   Eyes:    EOM are normal. Pupils are equal, round, and reactive to light.   Neck:    Neck supple.   Cardiovascular:  Normal rate, regular rhythm and normal heart sounds.      Exam reveals no gallop and no friction rub.       No murmur heard.  Pulmonary/Chest:  Effort normal and breath sounds normal.      No respiratory distress. No wheezes. No rales.      No reproducible chest wall pain.  Abdominal:   General tenderness. No guarding or rebound.    Musculoskeletal:  Normal range of motion.   Neurological:   Alert and oriented to person, place, and time.           Moves all 4 extremities spontaneously    Skin:    No rash noted. No pallor.     Emergency Department Course   ECG  ECG results from 06/22/22   EKG 12-lead, tracing only     Value    Systolic Blood Pressure     Diastolic Blood Pressure     Ventricular Rate 90    Atrial Rate 90    MO Interval 132    QRS Duration 88        QTc 440    P Axis 57    R AXIS 6    T Axis 50    Interpretation ECG      Sinus rhythm  Inferior infarct , age undetermined  Abnormal ECG  No previous ECGs available  Confirmed by - EMERGENCY ROOM, PHYSICIAN ([id number]),  MARYANA RILEY ([id number]) on 6/22/2022 6:36:23 AM       Imaging:  CT Abdomen Pelvis w Contrast   Final Result   IMPRESSION:    1.  Postoperative changes with resection of the terminal ileum and base of the cecum with ileocolonic  anastomosis in the right lower quadrant.      2.  No definite acute inflammatory bowel findings. Negative for obstruction.      3.  Fluid seen throughout normal caliber colon, nonspecific and compatible with diarrheal state. No obvious bowel wall thickening.      4.  IUD within the uterus.      5.  No significant findings elsewhere.         Report per radiology    Laboratory:  Labs Ordered and Resulted from Time of ED Arrival to Time of ED Departure   ISTAT HCG QUALITATIVE PREGNANCY POCT - Normal       Result Value    HCG Qualitative POCT Negative     COMPREHENSIVE METABOLIC PANEL   LIPASE   URINE MACROSCOPIC WITH REFLEX TO MICRO   CBC WITH PLATELETS AND DIFFERENTIAL   ENTERIC BACTERIA AND VIRUS PANEL BY [facility] STOOL   CLOSTRIDIUM DIFFICILE TOXIN B        Emergency Department Course:     Reviewed:  I reviewed nursing notes, vitals and past medical history    Assessments:  0140 I obtained history and examined the patient as noted above.   0314 I rechecked the patient and explained findings. Pain and nausea improved.   0510 At this point I feel that the patient is safe for discharge, and the patient agrees.     Interventions:  Medications   scopolamine (TRANSDERM) 72 hr patch 1 patch (1 patch Transdermal Patch/Med Applied 6/22/22 0159)     And   scopolamine (TRANSDERM-SCOP) Patch in Place (has no administration in time range)   metoclopramide (REGLAN) 5 MG/ML injection (has no administration in time range)   HYDROmorphone (PF) (DILAUDID) 0.5 MG/0.5 ML injection (has no administration in time range)   diphenhydrAMINE (BENADRYL) 50 MG/ML injection (has no administration in time range)   0.9% sodium chloride BOLUS (1,000 mLs Intravenous New Bag 6/22/22 0158)   ketorolac (TORADOL) injection 10 mg (10 mg Intravenous Given 6/22/22 0159)   0.9% sodium chloride BOLUS (65 mLs Intravenous New Bag 6/22/22 0551)   iopamidol (ISOVUE-370) solution 500 mL (60 mLs Intravenous Given 6/22/22 0551)      Disposition:  The patient was  discharged to home.     Impression & Plan     Medical Decision Makin-year-old female history of Crohn's disease who is here complaining of worsening abdominal pain and diarrhea.  Differential includes Crohn's flare, C. Difficile, small bowel obstruction, abscess, fistula, or other causes.  He did end up having work-up as seen including CT of the abdomen pelvis that was reassuring.  CT shows findings of diarrhea with no mention of obstruction abscess or fistula.  Upon reevaluation she is currently feeling improved.  We did obtain stool cultures which are currently pending I see no reason to preemptively start on antibiotics.  She is instructed to continue oral hydration and use Imodium OTC and to follow-up with her GI specialist.  Should return for worsening abdominal pain fever vomiting not tolerating p.o. All questions answered and the patient was discharged home in good condition.     Diagnosis:    ICD-10-CM    1. Diarrhea, unspecified type  R19.7    2. Acute abdominal pain  R10.9    3. Crohn's disease with complication, unspecified gastrointestinal tract location (H)  K50.919        Discharge Medications:  Discharge Medication List as of 2022  6:03 AM        Scribe Disclosure:  IBev, am serving as a scribe at 1:40 AM on 2022 to document services personally performed by Roberto Belcher MD based on my observations and the provider's statements to me.   Azalea RODRIGUEZ, am serving as a scribe  at 1:45 AM on 2022 to document services personally performed by Roberto Belcher MD based on my observations and the provider's statements to me.        Roberto Belcher MD  22 [id number]

## 2022-06-22 NOTE — RESULT ENCOUNTER NOTE
Final Enteric Bacteria and Virus Panel by JUVENTINO Stool is NEGATIVE for all tested organisms (bacteria/virus).  No treatment or change in treatment per [facility] Lab Result Enteric Bacteria and Virus Panel protocol.

## 2022-06-22 NOTE — RESULT ENCOUNTER NOTE
Final Clostridium Difficile toxin B PCR is NEGATIVE.    No treatment or change in treatment per [facility] ED Lab Result Clostridium difficile protocol.

## 2022-06-24 ENCOUNTER — TELEPHONE (OUTPATIENT)
Dept: EMERGENCY MEDICINE | Facility: CLINIC | Age: 47
End: 2022-06-24

## 2022-06-24 NOTE — TELEPHONE ENCOUNTER
[facility] Lab result notification:    [place] ED lab result protocol used  misc    Reason for call  Notify of lab results, assess symptoms,  review ED providers recommendations/discharge instructions (if necessary) and advise per ED lab result f/u protocol    Lab Result   CBC  Lipase results    Information table from Emergency Dept Provider visit on 22  Symptoms reported at ED visit (Chief complaint, HPI) Chief Complaint: Diarrhea     The history is provided by the patient.      Estephanie Brown is a 47 year old female with history of Crohn's disease who presents with abdominal pain that began seven days ago and has now included emesis as of yesterday. Patient reports mucus in stool and diarrhea. Patient shares that she was seen earlier today for a sore throat and was given a COVID PCR test- results pending. Patient takes colestipol 3 MG daily. She denies use of immunosuppressants, recent complications, bloody stool, and recent antibiotic use.    ED providers Impression and Plan (applicable information) Medical Decision Makin-year-old female history of Crohn's disease who is here complaining of worsening abdominal pain and diarrhea.  Differential includes Crohn's flare, C. Difficile, small bowel obstruction, abscess, fistula, or other causes.  He did end up having work-up as seen including CT of the abdomen pelvis that was reassuring.  CT shows findings of diarrhea with no mention of obstruction abscess or fistula.  Upon reevaluation she is currently feeling improved.  We did obtain stool cultures which are currently pending I see no reason to preemptively start on antibiotics.  She is instructed to continue oral hydration and use Imodium OTC and to follow-up with her GI specialist.  Should return for worsening abdominal pain fever vomiting not tolerating p.o. All questions answered and the patient was discharged home in good condition.    Miscellaneous  information NA     RN Assessment (Patient s current Symptoms), include time called.  [Insert Left message here if message left]  She reports her symptoms have improved some but not fully resolved    RN Recommendations/Instructions per [place] ED lab result protocol  Patient notified of lab result and treatment recommendations. Relayed lipase and abnormal CBC results which are visible on her Mychart,  Encouraged to followup with PCP to discuss        Omar Moncada RN  [facility]  Emergency Dept Lab Result RN  Ph# [phone]     Copy of Lab result   Component      Latest Ref Rng & Units 6/22/2022   Lipase      73 - 393 U/L 71 (L)     Component      Latest Ref Rng & Units 6/22/2022   WBC      4.0 - 11.0 10e3/uL 12.2 (H)   RBC Count      3.80 - 5.20 10e6/uL 4.67   Hemoglobin      11.7 - 15.7 g/dL 14.4   Hematocrit      35.0 - 47.0 % 43.8   MCV      78 - 100 fL 94   MCH      26.5 - 33.0 pg 30.8   MCHC      31.5 - 36.5 g/dL 32.9   RDW      10.0 - 15.0 % 12.6   Platelet Count      150 - 450 10e3/uL 346   % Neutrophils      % 75   % Lymphocytes      % 17   % Monocytes      % 7   % Eosinophils      % 1   % Basophils      % 0   % Immature Granulocytes      % 0   NRBCs per 100 WBC      <1 /100 0   Absolute Neutrophils      1.6 - 8.3 10e3/uL 9.0 (H)   Absolute Lymphocytes      0.8 - 5.3 10e3/uL 2.1   Absolute Monocytes      0.0 - 1.3 10e3/uL 0.9   Absolute Eosinophils      0.0 - 0.7 10e3/uL 0.2   Absolute Basophils      0.0 - 0.2 10e3/uL 0.0   Absolute Immature Granulocytes      <=0.4 10e3/uL 0.0   Absolute NRBCs      10e3/uL 0.0

## 2022-06-27 NOTE — PROGRESS NOTES
[facility] Dermatology Note  Encounter Date: Ellis 3, 2021  Office Visit     Dermatology Problem List:  1. Hidradenitis suppurativa  2. Cystic acne chin  3. Seborrheic keratoses  4. Mirena in place  5. Crohns disease  - Previously treated with:     Humira - helped but stopped helping Crohn's so they stopped it   Enbrel  - did not help HS or Crohns   Stelara - did not help HS or Crohns   Azathioprine - Helped Crohns but not HS. Stopped azathioprine  because of elvated LFTs, but Crohns never flard back up  6. 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 Oral allergy syndrome  Family hx of colitis: Father, Maternal uncle  Patient also exists in our medical record with MRN: [id number] & [id number]    ____________________________________________    Assessment & Plan:   # Hidradenitis suppurativa.  -Slightly improved on spironolactone and starting metformin. She would like to see how this combination does before making any changes.    - We discussed several options including re-trying humira, now that crohns is controlled, infliximab (which she is hesitant to try due to her family member's experience), clinical trial vs other  - We injected 4 lesions with kenalog-10 with a total of 4cc      Follow-up: Will schedule follow-up in 12 weeks to see how she responds to the metformin/spironolactone      Staff:     Caio Coronado MD, FAAD    Departments of Internal Medicine and Dermatology  [facility]  [phone]    Addendum 6/29/21:   Pt developed nausea with hepatitis with cholestatic picture after a month of combination spironolactone and metformin. She stopped the meds and is doing better. Will schedule 6 week phone follow-up to decide on next steps.  __________________________________________    CC: Derm Problem (HS, new, flaring ears, groin, breast, buttocks, abdomen)    HPI:  Ms. Estephanie Brown is a(n) 46 year old female who presents today as a return patient for Hidradenitis  Physical Therapy     Referred by: Bob Remy MD; Medical Diagnosis (from order):    Diagnosis Information      Diagnosis    719.45 (ICD-9-CM) - M25.552 (ICD-10-CM) - Left hip pain    V43.64 (ICD-9-CM) - Z96.641 (ICD-10-CM) - Status post total hip replacement, right                Visit Type: Daily Treatment Note    Visit:  6   Diagnosis Precautions: Past Medical History:  No date: Arthritis  No date: Chronic pain  No date: Degenerative disc disease, lumbar  No date: High cholesterol  No date: Pain     R direct anterior hip replacement 8/5/20     L hip xray: Left hip osteoarthritis.     SUBJECTIVE                                                                                                               HX: History of R THR 8/5/20. Both hips have been hurting for some time. Started a walking program after the hip replacement. covid and winter came and I gained weight and stopped walking as much. 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 If I walk too much my hip hurts. I lift boxes at work.      Pain continues to come and go without reason. My balance is getting worse and my neuropathy is bad.  Pain / Symptoms:  Pain/symptom is: intermittent  Pain rating (out of 10): Current: 4     OBJECTIVE                                                                                                                     Observation:   Comments / Details:   Tight bi hip flexors  L SLS reveals hip drop  Tender to L upper lumbar lateral region      TREATMENT                                                                                                                  Therapeutic Exercise:  Stationary bike seat 6 x 10 minutes, level 3  Standing calf stretch 30 sec x 3  Patient ambulated 540' including climbing and descending 14 steps x 2 with 2 rails- good form  Reviewed patients own HEP:  hip abd, glut medius, BKFO, active knee to chest, hip abduction, spine quad and glut setting  core  "suppurativa. She developed her first HS symptoms at age 14 and has been dealing with this since. She also has a history of Crohn's disease and was on Humira for a while for this, and her HS symptoms were significantly reduced. She unfortunately had to stop these injections though as her Crohns symptoms were not well controlled. She then tried other meds for her Crohns including stelara and azathioprine which did not help the HS. Currently the Crohns is well quiet but the HS flared.  She szaw Alexandria Herrera in March who started her on spironolactone. She just started this 2 weeks ago. She is also planning on starting metformin for elevated fasting insulin levels. Prescribed by PCP.    Patient is otherwise feeling well, without additional skin concerns.    - No hx of pregnancy   - Has been  Intermittent fasting    HS Nurse Assessment    Nurse Assessment Data 6/3/2021   Over the past month, about how many recurrent or new boils have you had? 11   Over the past week, how many dressing changes do you do each day? 1   Over the past week, has your wound drainage been: Moderate   Rate your HS overall from 0-10 (0 = no disease, 10 = worst) over the past week:  7   Rate your pain score from 0-10 (0 = no disease, 10 = worst) for the most painful/symptomatic lesion in the past week:  5 - Moderate Pain   Over the past week, how much has HS influenced your quality of life? very much     Physical Exam:  Vitals: /79 (BP Location: Left arm)   Pulse 93   Wt 136.1 kg (300 lb)     HS Exam  HS Exam  Head/Neck 6/3/2021   Head/Neck Comments Scarring and scar tunnels behind ears with small cysts       Left Axilla 6/3/2021   Left Axilla Comments 3 scarred pink papules; Elia notes some \"tunneling\" in the area         Chest 6/3/2021   # of inflamed nodules 1   # of abcesses 0   # of draining tunnels 0       Abdomen 6/3/2021   Abdomen Comments 1 pink scarred papule on the upper abdomen        Pubis/Genital 6/3/2021   # of inflamed " activation with arms overhead and kegals x 5      Therapeutic Activity:  Lawn cutting training for back protection- reviewed    Neuromuscular Re-Education:  Load assessment of pari LE- reproduction of left lumbar pain, L hip drop noted  Retro ambulation - WNL  Review of patient's own balance HEP    Skilled input: verbal instruction/cues and tactile instruction/cues    Writer verbally educated and received verbal consent for hand placement, positioning of patient, and techniques to be performed today from patient     Home Exercise Program/Education Materials:   Standing calf stretch 30 sec x 3  Glut medius x 15  core activation with arms overhead and kegals x 5        Bike at home 7 mins daily  Stop SLR  Avoid HEP with L leg raising or with pari LE extended  Avoid standing extension and bridging      Ultrasound (66400)  Location: L lumbar  Position: sidelying  Duty Cycle: 100%  Frequency: 1 Mhz  Intensity (w/cm2): 1.5  Duration: 8 minutes  Results: no change in symptoms immediately following      ASSESSMENT                                                                                                              Patients sxs appear to be worse with extension and better with flexion. Good core awareness with new exercise. L hip drop with SLS. Pains in L hip have reduced nicely but left lumbar pains remain.  Pain/symptoms after session (out of 10): 4  Patient Education:   Results of above outlined education: Needs reinforcement, Demonstrates understanding and Verbalizes understanding      PLAN                                                                                                                           Suggestions for next session as indicated: Progress per plan of care,  bike, assess response to US and repeat if beneficial, posterior leg stretching         Therapy procedure time and total treatment time can be found documented on the Time Entry flowsheet   nodules 0   # of abcesses 0   # of draining tunnels 1       Left Thigh 6/3/2021   # of inflamed nodules 1   # of abcesses 0   # of draining tunnels 0       No flowsheet data found.    No flowsheet data found.    Buttocks 6/3/2021   Pilonidal Disease? No   Pioderma Gangrenosum? No   Cutaneous Crohn's? No       HS Data  HS Exam Data 6/3/2021   LC Type LC1   Clinical Subtypes Regular type   Acne? No   Dissecting Cellulitis? No   Total Robertson Stage II   Total Inflammatory Nodules 2   Total Abcesses 0   Total Draining Tunnels 1   Total Abscess and Nodule Count 2   IHS4 Score  6         - No other lesions of concern on areas examined.     Medications:  Current Outpatient Medications   Medication     Acetaminophen 325 MG CAPS     albuterol (PROAIR HFA/PROVENTIL HFA/VENTOLIN HFA) 108 (90 Base) MCG/ACT inhaler     ALPRAZolam (XANAX) 0.25 MG tablet     amitriptyline (ELAVIL) 10 MG tablet     amLODIPine (NORVASC) 5 MG tablet     Ascorbic Acid (VITAMIN C) 500 MG CHEW     aspirin (ASA) 81 MG chewable tablet     busPIRone (BUSPAR) 10 MG tablet     colestipol (COLESTID) 1 g tablet     Cyanocobalamin 1000 MCG/15ML LIQD     EPINEPHrine (EPIPEN JR) 0.15 MG/0.3ML injection 2-pack     fish oil-omega-3 fatty acids 1000 MG capsule     fluconazole (DIFLUCAN) 150 MG tablet     folic acid (FOLVITE) 1 MG tablet     gabapentin (NEURONTIN) 300 MG capsule     ketoconazole (NIZORAL) 2 % external shampoo     levonorgestrel (MIRENA) 20 MCG/24HR IUD     loperamide (IMODIUM A-D) 2 MG tablet     losartan (COZAAR) 100 MG tablet     magnesium 250 MG tablet     metFORMIN (GLUCOPHAGE) 500 MG tablet     multivitamin w/minerals (MULTI-VITAMIN) tablet     Saccharomyces boulardii (PROBIOTIC) 250 MG CAPS     simethicone (MYLICON) 80 MG chewable tablet     venlafaxine (EFFEXOR-XR) 150 MG 24 hr capsule     Vitamin D, Cholecalciferol, 25 MCG (1000 UT) TABS     spironolactone (ALDACTONE) 50 MG tablet     No current facility-administered medications for this  visit.       Past Medical History:     CC No referring provider defined for this encounter. on close of this encounter.

## 2023-01-14 ENCOUNTER — HEALTH MAINTENANCE LETTER (OUTPATIENT)
Age: 48
End: 2023-01-14

## 2023-06-02 ENCOUNTER — HEALTH MAINTENANCE LETTER (OUTPATIENT)
Age: 48
End: 2023-06-02

## 2023-09-11 ENCOUNTER — APPOINTMENT (OUTPATIENT)
Dept: CT IMAGING | Facility: CLINIC | Age: 48
End: 2023-09-11
Attending: EMERGENCY MEDICINE
Payer: COMMERCIAL

## 2023-09-11 ENCOUNTER — HOSPITAL ENCOUNTER (EMERGENCY)
Facility: CLINIC | Age: 48
Discharge: HOME | End: 2023-09-11
Attending: EMERGENCY MEDICINE | Admitting: EMERGENCY MEDICINE
Payer: COMMERCIAL

## 2023-09-11 VITALS
HEART RATE: 86 BPM | SYSTOLIC BLOOD PRESSURE: 141 MMHG | RESPIRATION RATE: 18 BRPM | OXYGEN SATURATION: 100 % | TEMPERATURE: 97.5 F | DIASTOLIC BLOOD PRESSURE: 79 MMHG

## 2023-09-11 DIAGNOSIS — K52.9 ACUTE GASTROENTERITIS: ICD-10-CM

## 2023-09-11 LAB
ADV 40+41 DNA STL QL NAA+NON-PROBE: NEGATIVE
ALBUMIN SERPL BCG-MCNC: 4.4 G/DL (ref 3.5–5.2)
ALP SERPL-CCNC: 72 U/L (ref 35–104)
ALT SERPL W P-5'-P-CCNC: 15 U/L (ref 0–50)
ANION GAP SERPL CALCULATED.3IONS-SCNC: 9 MMOL/L (ref 7–15)
AST SERPL W P-5'-P-CCNC: 17 U/L (ref 0–45)
ASTRO TYP 1-8 RNA STL QL NAA+NON-PROBE: NEGATIVE
BASOPHILS # BLD AUTO: 0 10E3/UL (ref 0–0.2)
BASOPHILS NFR BLD AUTO: 0 %
BILIRUB SERPL-MCNC: 0.7 MG/DL
BUN SERPL-MCNC: 10.7 MG/DL (ref 6–20)
C CAYETANENSIS DNA STL QL NAA+NON-PROBE: NEGATIVE
C DIFF TOX B STL QL: NEGATIVE
CALCIUM SERPL-MCNC: 9.8 MG/DL (ref 8.6–10)
CAMPYLOBACTER DNA SPEC NAA+PROBE: NEGATIVE
CHLORIDE SERPL-SCNC: 100 MMOL/L (ref 98–107)
CREAT SERPL-MCNC: 0.77 MG/DL (ref 0.51–0.95)
CRP SERPL-MCNC: 12.3 MG/L
CRYPTOSP DNA STL QL NAA+NON-PROBE: NEGATIVE
DEPRECATED HCO3 PLAS-SCNC: 28 MMOL/L (ref 22–29)
E COLI O157 DNA STL QL NAA+NON-PROBE: NORMAL
E HISTOLYT DNA STL QL NAA+NON-PROBE: NEGATIVE
EAEC ASTA GENE ISLT QL NAA+PROBE: NEGATIVE
EC STX1+STX2 GENES STL QL NAA+NON-PROBE: NEGATIVE
EGFRCR SERPLBLD CKD-EPI 2021: >90 ML/MIN/1.73M2
EOSINOPHIL # BLD AUTO: 0.1 10E3/UL (ref 0–0.7)
EOSINOPHIL NFR BLD AUTO: 1 %
EPEC EAE GENE STL QL NAA+NON-PROBE: NEGATIVE
ERYTHROCYTE [DISTWIDTH] IN BLOOD BY AUTOMATED COUNT: 11.9 % (ref 10–15)
ERYTHROCYTE [SEDIMENTATION RATE] IN BLOOD BY WESTERGREN METHOD: 18 MM/HR (ref 0–20)
ETEC LTA+ST1A+ST1B TOX ST NAA+NON-PROBE: NEGATIVE
G LAMBLIA DNA STL QL NAA+NON-PROBE: NEGATIVE
GLUCOSE SERPL-MCNC: 111 MG/DL (ref 70–99)
HCT VFR BLD AUTO: 42.9 % (ref 35–47)
HGB BLD-MCNC: 14.6 G/DL (ref 11.7–15.7)
IMM GRANULOCYTES # BLD: 0.1 10E3/UL
IMM GRANULOCYTES NFR BLD: 0 %
LACTATE SERPL-SCNC: 1 MMOL/L (ref 0.7–2)
LIPASE SERPL-CCNC: 19 U/L (ref 13–60)
LYMPHOCYTES # BLD AUTO: 1.2 10E3/UL (ref 0.8–5.3)
LYMPHOCYTES NFR BLD AUTO: 8 %
MCH RBC QN AUTO: 32.1 PG (ref 26.5–33)
MCHC RBC AUTO-ENTMCNC: 34 G/DL (ref 31.5–36.5)
MCV RBC AUTO: 94 FL (ref 78–100)
MONOCYTES # BLD AUTO: 0.8 10E3/UL (ref 0–1.3)
MONOCYTES NFR BLD AUTO: 6 %
NEUTROPHILS # BLD AUTO: 12.9 10E3/UL (ref 1.6–8.3)
NEUTROPHILS NFR BLD AUTO: 85 %
NOROVIRUS GI+II RNA STL QL NAA+NON-PROBE: NEGATIVE
NRBC # BLD AUTO: 0 10E3/UL
NRBC BLD AUTO-RTO: 0 /100
P SHIGELLOIDES DNA STL QL NAA+NON-PROBE: NEGATIVE
PLATELET # BLD AUTO: 288 10E3/UL (ref 150–450)
POTASSIUM SERPL-SCNC: 3.9 MMOL/L (ref 3.4–5.3)
PROT SERPL-MCNC: 8.1 G/DL (ref 6.4–8.3)
RBC # BLD AUTO: 4.55 10E6/UL (ref 3.8–5.2)
RVA RNA STL QL NAA+NON-PROBE: NEGATIVE
SALMONELLA SP RPOD STL QL NAA+PROBE: NEGATIVE
SAPO I+II+IV+V RNA STL QL NAA+NON-PROBE: NEGATIVE
SHIGELLA SP+EIEC IPAH ST NAA+NON-PROBE: NEGATIVE
SODIUM SERPL-SCNC: 137 MMOL/L (ref 136–145)
V CHOLERAE DNA SPEC QL NAA+PROBE: NEGATIVE
VIBRIO DNA SPEC NAA+PROBE: NEGATIVE
WBC # BLD AUTO: 15.1 10E3/UL (ref 4–11)
Y ENTEROCOL DNA STL QL NAA+PROBE: NEGATIVE

## 2023-09-11 PROCEDURE — 83605 ASSAY OF LACTIC ACID: CPT | Performed by: EMERGENCY MEDICINE

## 2023-09-11 PROCEDURE — 250N000009 HC RX 250: Performed by: EMERGENCY MEDICINE

## 2023-09-11 PROCEDURE — 250N000011 HC RX IP 250 OP 636: Mod: JZ | Performed by: EMERGENCY MEDICINE

## 2023-09-11 PROCEDURE — 258N000003 HC RX IP 258 OP 636: Performed by: EMERGENCY MEDICINE

## 2023-09-11 PROCEDURE — 36415 COLL VENOUS BLD VENIPUNCTURE: CPT | Performed by: EMERGENCY MEDICINE

## 2023-09-11 PROCEDURE — 85652 RBC SED RATE AUTOMATED: CPT | Performed by: EMERGENCY MEDICINE

## 2023-09-11 PROCEDURE — 86140 C-REACTIVE PROTEIN: CPT | Performed by: EMERGENCY MEDICINE

## 2023-09-11 PROCEDURE — 99285 EMERGENCY DEPT VISIT HI MDM: CPT | Mod: 25

## 2023-09-11 PROCEDURE — 96361 HYDRATE IV INFUSION ADD-ON: CPT

## 2023-09-11 PROCEDURE — 96375 TX/PRO/DX INJ NEW DRUG ADDON: CPT

## 2023-09-11 PROCEDURE — 87493 C DIFF AMPLIFIED PROBE: CPT | Mod: XU | Performed by: EMERGENCY MEDICINE

## 2023-09-11 PROCEDURE — 80053 COMPREHEN METABOLIC PANEL: CPT | Performed by: EMERGENCY MEDICINE

## 2023-09-11 PROCEDURE — 96374 THER/PROPH/DIAG INJ IV PUSH: CPT | Mod: 59

## 2023-09-11 PROCEDURE — 74177 CT ABD & PELVIS W/CONTRAST: CPT

## 2023-09-11 PROCEDURE — 87507 IADNA-DNA/RNA PROBE TQ 12-25: CPT | Performed by: EMERGENCY MEDICINE

## 2023-09-11 PROCEDURE — 83690 ASSAY OF LIPASE: CPT | Performed by: EMERGENCY MEDICINE

## 2023-09-11 PROCEDURE — 250N000011 HC RX IP 250 OP 636: Performed by: EMERGENCY MEDICINE

## 2023-09-11 PROCEDURE — 85025 COMPLETE CBC W/AUTO DIFF WBC: CPT | Performed by: EMERGENCY MEDICINE

## 2023-09-11 RX ORDER — IOPAMIDOL 755 MG/ML
500 INJECTION, SOLUTION INTRAVASCULAR ONCE
Status: COMPLETED | OUTPATIENT
Start: 2023-09-11 | End: 2023-09-11

## 2023-09-11 RX ORDER — ONDANSETRON 2 MG/ML
4 INJECTION INTRAMUSCULAR; INTRAVENOUS EVERY 30 MIN PRN
Status: DISCONTINUED | OUTPATIENT
Start: 2023-09-11 | End: 2023-09-11

## 2023-09-11 RX ORDER — SCOLOPAMINE TRANSDERMAL SYSTEM 1 MG/1
1 PATCH, EXTENDED RELEASE TRANSDERMAL
Status: DISCONTINUED | OUTPATIENT
Start: 2023-09-11 | End: 2023-09-11 | Stop reason: HOSPADM

## 2023-09-11 RX ORDER — DEXAMETHASONE SODIUM PHOSPHATE 10 MG/ML
10 INJECTION, SOLUTION INTRAMUSCULAR; INTRAVENOUS ONCE
Status: COMPLETED | OUTPATIENT
Start: 2023-09-11 | End: 2023-09-11

## 2023-09-11 RX ORDER — KETOROLAC TROMETHAMINE 15 MG/ML
15 INJECTION, SOLUTION INTRAMUSCULAR; INTRAVENOUS ONCE
Status: COMPLETED | OUTPATIENT
Start: 2023-09-11 | End: 2023-09-11

## 2023-09-11 RX ORDER — DIPHENHYDRAMINE HYDROCHLORIDE 50 MG/ML
25 INJECTION INTRAMUSCULAR; INTRAVENOUS ONCE
Status: COMPLETED | OUTPATIENT
Start: 2023-09-11 | End: 2023-09-11

## 2023-09-11 RX ADMIN — KETOROLAC TROMETHAMINE 15 MG: 15 INJECTION, SOLUTION INTRAMUSCULAR; INTRAVENOUS at 02:50

## 2023-09-11 RX ADMIN — SCOPALAMINE 1 PATCH: 1 PATCH, EXTENDED RELEASE TRANSDERMAL at 02:59

## 2023-09-11 RX ADMIN — IOPAMIDOL 100 ML: 755 INJECTION, SOLUTION INTRAVENOUS at 03:22

## 2023-09-11 RX ADMIN — DIPHENHYDRAMINE HYDROCHLORIDE 25 MG: 50 INJECTION, SOLUTION INTRAMUSCULAR; INTRAVENOUS at 03:43

## 2023-09-11 RX ADMIN — SODIUM CHLORIDE 1000 ML: 9 INJECTION, SOLUTION INTRAVENOUS at 02:52

## 2023-09-11 RX ADMIN — DEXAMETHASONE SODIUM PHOSPHATE 10 MG: 10 INJECTION, SOLUTION INTRAMUSCULAR; INTRAVENOUS at 03:44

## 2023-09-11 RX ADMIN — SODIUM CHLORIDE 65 ML: 9 INJECTION, SOLUTION INTRAVENOUS at 03:22

## 2023-09-11 ASSESSMENT — ACTIVITIES OF DAILY LIVING (ADL)
ADLS_ACUITY_SCORE: 35
ADLS_ACUITY_SCORE: 35

## 2023-09-11 NOTE — ED PROVIDER NOTES
History     Chief Complaint:  Abdominal Pain       HPI   Estephanie Brown is a 48 year old female with past medical history of Crohn's disease not currently on any immune modulating medications, anxiety, B12 deficiency and GERD who presents to the emergency department with diffuse abdominal pain, nonbloody diarrhea and vomiting.  Symptoms began around 5:00 earlier this evening.  Suspicious he did have multiple episodes of vomiting and diarrhea.  Denies any fever.  No recent steroids use.  She notes she has history of an ileal colic resection due to stricture.  She denies any recent travel.  No recent sick contacts.    Independent Historian:   None - Patient Only    Medications:    Acetaminophen 325 MG CAPS  albuterol (PROAIR HFA/PROVENTIL HFA/VENTOLIN HFA) 108 (90 Base) MCG/ACT inhaler  ALLEGRA PO  ALPRAZolam (XANAX) 0.25 MG tablet  amitriptyline (ELAVIL) 10 MG tablet  amLODIPine (NORVASC) 5 MG tablet  Ascorbic Acid (VITAMIN C) 500 MG CHEW  aspirin (ASA) 81 MG chewable tablet  busPIRone (BUSPAR) 10 MG tablet  colestipol (COLESTID) 1 g tablet  Cyanocobalamin 1000 MCG/15ML LIQD  EPINEPHrine (EPIPEN JR) 0.15 MG/0.3ML injection 2-pack  fish oil-omega-3 fatty acids 1000 MG capsule  fluconazole (DIFLUCAN) 150 MG tablet  folic acid (FOLVITE) 1 MG tablet  gabapentin (NEURONTIN) 300 MG capsule  HUMIRA SC  ketoconazole (NIZORAL) 2 % external shampoo  levonorgestrel (MIRENA) 20 MCG/24HR IUD  LEVORA 0.15/30 (28) 0.15-30 MG-MCG PO TABS  loperamide (IMODIUM A-D) 2 MG tablet  losartan (COZAAR) 100 MG tablet  magnesium 250 MG tablet  metFORMIN (GLUCOPHAGE) 500 MG tablet  multivitamin w/minerals (THERA-VIT-M) tablet  OMEPRAZOLE 10 MG PO CPDR  PROAIR  (90 BASE) MCG/ACT IN AERS  Resorcinol POWD  Resorcinol POWD  Saccharomyces boulardii (PROBIOTIC) 250 MG CAPS  simethicone (MYLICON) 80 MG chewable tablet  spironolactone (ALDACTONE) 100 MG tablet  TYLENOL 325 MG PO TABS  venlafaxine (EFFEXOR-XR) 150 MG 24 hr capsule  VENLAFAXINE HCL  37.5 MG PO TABS  Vitamin D, Cholecalciferol, 25 MCG (1000 UT) TABS  WEGOVY 0.25 MG/0.5ML SOAJ        Past Medical History:    No past medical history on file.    Past Surgical History:    No past surgical history on file.     Physical Exam   Patient Vitals for the past 24 hrs:   BP Temp Temp src Pulse Resp SpO2   09/11/23 0517 -- -- -- -- -- 100 %   09/11/23 0515 (!) 141/79 -- -- 86 -- --   09/11/23 0159 (!) 157/96 97.5  F (36.4  C) Temporal 93 18 100 %        Physical Exam  General: Patient is awake, alert and interactive when I enter the room. Appears uncomfortable.   Head: The scalp, face, and head appear normal  Eyes: Conjunctivae and sclerae are normal  Neck: Normal range of motion.   CV: Regular rate.   Resp:  No respiratory distress.   GI: lower abdominal tenderness but abdomen is soft, no rigidity. No evidence of pulsatile mass. No fluid waves or evidence of ascites. No distension. No hernias or bruising are noted in detailed exam. No CVA tenderness.    MS: Normal tone.   Skin: Normal capillary refill noted  Neuro: Speech is normal and fluent. Face is symmetric. Moving all extremities.   Psych:  Normal affect.  Appropriate interactions.    Emergency Department Course     Imaging:  CT Abdomen Pelvis w Contrast   Final Result   IMPRESSION:    1.  Fluid is present throughout the lumen of the colon and there is also fluid in the ileal lumen. This is nonspecific, but could relate to gastroenteritis.   2.  No other cause of acute pain identified in the abdomen or pelvis.   3.  No CT evidence of active Crohn's disease.          Report per radiology    Laboratory:  Labs Ordered and Resulted from Time of ED Arrival to Time of ED Departure   COMPREHENSIVE METABOLIC PANEL - Abnormal       Result Value    Sodium 137      Potassium 3.9      Chloride 100      Carbon Dioxide (CO2) 28      Anion Gap 9      Urea Nitrogen 10.7      Creatinine 0.77      Calcium 9.8      Glucose 111 (*)     Alkaline Phosphatase 72      AST 17       ALT 15      Protein Total 8.1      Albumin 4.4      Bilirubin Total 0.7      GFR Estimate >90     CRP INFLAMMATION - Abnormal    CRP Inflammation 12.30 (*)    CBC WITH PLATELETS AND DIFFERENTIAL - Abnormal    WBC Count 15.1 (*)     RBC Count 4.55      Hemoglobin 14.6      Hematocrit 42.9      MCV 94      MCH 32.1      MCHC 34.0      RDW 11.9      Platelet Count 288      % Neutrophils 85      % Lymphocytes 8      % Monocytes 6      % Eosinophils 1      % Basophils 0      % Immature Granulocytes 0      NRBCs per 100 WBC 0      Absolute Neutrophils 12.9 (*)     Absolute Lymphocytes 1.2      Absolute Monocytes 0.8      Absolute Eosinophils 0.1      Absolute Basophils 0.0      Absolute Immature Granulocytes 0.1      Absolute NRBCs 0.0     LIPASE - Normal    Lipase 19     LACTIC ACID WHOLE BLOOD - Normal    Lactic Acid 1.0     C. DIFFICILE TOXIN B PCR WITH REFLEX TO C. DIFFICILE ANTIGEN AND TOXINS A/B EIA - Normal    C Difficile Toxin B by PCR Negative          Emergency Department Course & Assessments:    Interventions:  Medications   scopolamine (TRANSDERM) 72 hr patch 1 patch (1 patch Transdermal $Patch/Med Applied 9/11/23 0259)     And   scopolamine (TRANSDERM-SCOP) Patch in Place ( Transdermal Patch in Place 9/11/23 0302)   0.9% sodium chloride BOLUS (0 mLs Intravenous Stopped 9/11/23 0520)   ketorolac (TORADOL) injection 15 mg (15 mg Intravenous $Given 9/11/23 0250)   iopamidol (ISOVUE-370) solution 500 mL (100 mLs Intravenous $Given 9/11/23 0322)   Sodium Chloride for CT Scan Flush Use (65 mLs Intravenous $Given 9/11/23 0322)   dexAMETHasone PF (DECADRON) injection 10 mg (10 mg Intravenous $Given 9/11/23 0344)   diphenhydrAMINE (BENADRYL) injection 25 mg (25 mg Intravenous $Given 9/11/23 0343)      Disposition:  The patient was discharged to home.     Impression & Plan      Medical Decision Making:  This a very kind 48-year-old woman with past with a history of Crohn's disease who is not currently on immune  modulating medications who presents to the emergency department with diffuse abdominal pain, nonbloody diarrhea and vomiting.  Upon initial evaluation, she is hemodynamically stable with normal vital signs.  She is afebrile and oxygenating well on room air.  However she appears quite uncomfortable.  She has diffuse lower abdominal tenderness but no significant guarding or rebound.  CT scan was obtained which shows no signs of complications of Crohn's disease including abscess, stricture or obstruction.  CT scan most consistent with gastroenteritis with large amount of liquid stool within the colon.  Blood work reveals a mild leukocytosis but no other significant electrolyte abnormalities.  She is very mildly elevated CRP but no significant elevation in her sed rate.  Lactate is normal.  C. difficile is negative.  Patient felt significantly improved with the aforementioned interventions.  Patient does not tolerate Zofran well but does respond well to scopolamine patch.  Nausea significantly improved.  Able to tolerate small amount of ice water.  Patient's pain significantly improved with the aforementioned interventions.  She feels comfortable going home.  We discussed reasons to return to the emergency department.  Patient will follow-up with her primary gastroenterologist as needed.    Diagnosis:    ICD-10-CM    1. Acute gastroenteritis  K52.9              MD Sunitha Eastman Christopher Joseph, MD  09/11/23 0631

## 2023-09-11 NOTE — ED TRIAGE NOTES
Arrives from home. States has a history of chron's disease and has been vomiting, which started today. States liquid stools. States concerns that she is vomiting stomach bile.     Denies fevers. Abdominal pain which is diffuse and severe, which started prior to the vomiting.

## 2023-09-25 ENCOUNTER — HOSPITAL ENCOUNTER (EMERGENCY)
Facility: CLINIC | Age: 48
Discharge: HOME | End: 2023-09-25
Attending: EMERGENCY MEDICINE | Admitting: EMERGENCY MEDICINE
Payer: COMMERCIAL

## 2023-09-25 ENCOUNTER — APPOINTMENT (OUTPATIENT)
Dept: CT IMAGING | Facility: CLINIC | Age: 48
End: 2023-09-25
Attending: EMERGENCY MEDICINE
Payer: COMMERCIAL

## 2023-09-25 VITALS
RESPIRATION RATE: 24 BRPM | WEIGHT: 220 LBS | DIASTOLIC BLOOD PRESSURE: 67 MMHG | SYSTOLIC BLOOD PRESSURE: 106 MMHG | BODY MASS INDEX: 31.57 KG/M2 | OXYGEN SATURATION: 100 % | HEART RATE: 93 BPM

## 2023-09-25 DIAGNOSIS — R11.2 NAUSEA, VOMITING, AND DIARRHEA: ICD-10-CM

## 2023-09-25 DIAGNOSIS — R10.84 GENERALIZED ABDOMINAL PAIN: ICD-10-CM

## 2023-09-25 DIAGNOSIS — R19.7 NAUSEA, VOMITING, AND DIARRHEA: ICD-10-CM

## 2023-09-25 LAB
ALBUMIN SERPL BCG-MCNC: 5 G/DL (ref 3.5–5.2)
ALBUMIN UR-MCNC: NEGATIVE MG/DL
ALP SERPL-CCNC: 83 U/L (ref 35–104)
ALT SERPL W P-5'-P-CCNC: 15 U/L (ref 0–50)
ANION GAP SERPL CALCULATED.3IONS-SCNC: 15 MMOL/L (ref 7–15)
APPEARANCE UR: ABNORMAL
AST SERPL W P-5'-P-CCNC: 23 U/L (ref 0–45)
ATRIAL RATE - MUSE: 100 BPM
BACTERIA #/AREA URNS HPF: ABNORMAL /HPF
BASOPHILS # BLD AUTO: 0.1 10E3/UL (ref 0–0.2)
BASOPHILS NFR BLD AUTO: 0 %
BILIRUB SERPL-MCNC: 0.6 MG/DL
BILIRUB UR QL STRIP: NEGATIVE
BUN SERPL-MCNC: 14.3 MG/DL (ref 6–20)
CALCIUM SERPL-MCNC: 11.1 MG/DL (ref 8.6–10)
CHLORIDE SERPL-SCNC: 97 MMOL/L (ref 98–107)
COLOR UR AUTO: YELLOW
CREAT SERPL-MCNC: 0.94 MG/DL (ref 0.51–0.95)
DEPRECATED HCO3 PLAS-SCNC: 25 MMOL/L (ref 22–29)
DIASTOLIC BLOOD PRESSURE - MUSE: NORMAL MMHG
EGFRCR SERPLBLD CKD-EPI 2021: 75 ML/MIN/1.73M2
EOSINOPHIL # BLD AUTO: 0.1 10E3/UL (ref 0–0.7)
EOSINOPHIL NFR BLD AUTO: 0 %
ERYTHROCYTE [DISTWIDTH] IN BLOOD BY AUTOMATED COUNT: 11.7 % (ref 10–15)
GLUCOSE SERPL-MCNC: 157 MG/DL (ref 70–99)
GLUCOSE UR STRIP-MCNC: NEGATIVE MG/DL
GRANULAR CAST: 1 /LPF
HCG SERPL QL: NEGATIVE
HCT VFR BLD AUTO: 49.9 % (ref 35–47)
HGB BLD-MCNC: 17.5 G/DL (ref 11.7–15.7)
HGB UR QL STRIP: NEGATIVE
HOLD SPECIMEN: NORMAL
HYALINE CASTS: 41 /LPF
IMM GRANULOCYTES # BLD: 0.1 10E3/UL
IMM GRANULOCYTES NFR BLD: 0 %
INTERPRETATION ECG - MUSE: NORMAL
KETONES UR STRIP-MCNC: ABNORMAL MG/DL
LACTATE SERPL-SCNC: 1.7 MMOL/L (ref 0.7–2)
LACTATE SERPL-SCNC: 3.6 MMOL/L (ref 0.7–2)
LEUKOCYTE ESTERASE UR QL STRIP: ABNORMAL
LIPASE SERPL-CCNC: 25 U/L (ref 13–60)
LYMPHOCYTES # BLD AUTO: 2 10E3/UL (ref 0.8–5.3)
LYMPHOCYTES NFR BLD AUTO: 9 %
MCH RBC QN AUTO: 32.6 PG (ref 26.5–33)
MCHC RBC AUTO-ENTMCNC: 35.1 G/DL (ref 31.5–36.5)
MCV RBC AUTO: 93 FL (ref 78–100)
MONOCYTES # BLD AUTO: 0.9 10E3/UL (ref 0–1.3)
MONOCYTES NFR BLD AUTO: 4 %
MUCOUS THREADS #/AREA URNS LPF: PRESENT /LPF
NEUTROPHILS # BLD AUTO: 19.7 10E3/UL (ref 1.6–8.3)
NEUTROPHILS NFR BLD AUTO: 87 %
NITRATE UR QL: NEGATIVE
NRBC # BLD AUTO: 0 10E3/UL
NRBC BLD AUTO-RTO: 0 /100
P AXIS - MUSE: 66 DEGREES
PH UR STRIP: 5.5 [PH] (ref 5–7)
PLATELET # BLD AUTO: 329 10E3/UL (ref 150–450)
POTASSIUM SERPL-SCNC: 4.4 MMOL/L (ref 3.4–5.3)
PR INTERVAL - MUSE: 128 MS
PROT SERPL-MCNC: 8.6 G/DL (ref 6.4–8.3)
QRS DURATION - MUSE: 86 MS
QT - MUSE: 348 MS
QTC - MUSE: 448 MS
R AXIS - MUSE: 24 DEGREES
RBC # BLD AUTO: 5.36 10E6/UL (ref 3.8–5.2)
RBC URINE: 4 /HPF
SODIUM SERPL-SCNC: 137 MMOL/L (ref 136–145)
SP GR UR STRIP: 1.02 (ref 1–1.03)
SQUAMOUS EPITHELIAL: 1 /HPF
SYSTOLIC BLOOD PRESSURE - MUSE: NORMAL MMHG
T AXIS - MUSE: 55 DEGREES
UROBILINOGEN UR STRIP-MCNC: NORMAL MG/DL
VENTRICULAR RATE- MUSE: 100 BPM
WBC # BLD AUTO: 22.8 10E3/UL (ref 4–11)
WBC URINE: 4 /HPF

## 2023-09-25 PROCEDURE — 36415 COLL VENOUS BLD VENIPUNCTURE: CPT | Performed by: EMERGENCY MEDICINE

## 2023-09-25 PROCEDURE — 84703 CHORIONIC GONADOTROPIN ASSAY: CPT | Performed by: EMERGENCY MEDICINE

## 2023-09-25 PROCEDURE — 99285 EMERGENCY DEPT VISIT HI MDM: CPT | Mod: 25

## 2023-09-25 PROCEDURE — 83605 ASSAY OF LACTIC ACID: CPT | Performed by: EMERGENCY MEDICINE

## 2023-09-25 PROCEDURE — 96374 THER/PROPH/DIAG INJ IV PUSH: CPT | Mod: 59

## 2023-09-25 PROCEDURE — 83690 ASSAY OF LIPASE: CPT | Performed by: EMERGENCY MEDICINE

## 2023-09-25 PROCEDURE — 250N000009 HC RX 250: Performed by: EMERGENCY MEDICINE

## 2023-09-25 PROCEDURE — 250N000011 HC RX IP 250 OP 636: Performed by: EMERGENCY MEDICINE

## 2023-09-25 PROCEDURE — 96361 HYDRATE IV INFUSION ADD-ON: CPT

## 2023-09-25 PROCEDURE — 81003 URINALYSIS AUTO W/O SCOPE: CPT | Performed by: EMERGENCY MEDICINE

## 2023-09-25 PROCEDURE — 85018 HEMOGLOBIN: CPT | Performed by: EMERGENCY MEDICINE

## 2023-09-25 PROCEDURE — 258N000003 HC RX IP 258 OP 636: Performed by: EMERGENCY MEDICINE

## 2023-09-25 PROCEDURE — 80053 COMPREHEN METABOLIC PANEL: CPT | Performed by: EMERGENCY MEDICINE

## 2023-09-25 PROCEDURE — 93005 ELECTROCARDIOGRAM TRACING: CPT

## 2023-09-25 PROCEDURE — 74177 CT ABD & PELVIS W/CONTRAST: CPT

## 2023-09-25 RX ORDER — IOPAMIDOL 755 MG/ML
500 INJECTION, SOLUTION INTRAVASCULAR ONCE
Status: COMPLETED | OUTPATIENT
Start: 2023-09-25 | End: 2023-09-25

## 2023-09-25 RX ORDER — SCOLOPAMINE TRANSDERMAL SYSTEM 1 MG/1
1 PATCH, EXTENDED RELEASE TRANSDERMAL
Qty: 10 PATCH | Refills: 0 | Status: SHIPPED | OUTPATIENT
Start: 2023-09-25

## 2023-09-25 RX ORDER — SCOLOPAMINE TRANSDERMAL SYSTEM 1 MG/1
1 PATCH, EXTENDED RELEASE TRANSDERMAL
Status: DISCONTINUED | OUTPATIENT
Start: 2023-09-25 | End: 2023-09-25 | Stop reason: HOSPADM

## 2023-09-25 RX ORDER — KETOROLAC TROMETHAMINE 15 MG/ML
15 INJECTION, SOLUTION INTRAMUSCULAR; INTRAVENOUS ONCE
Status: COMPLETED | OUTPATIENT
Start: 2023-09-25 | End: 2023-09-25

## 2023-09-25 RX ADMIN — SCOPALAMINE 1 PATCH: 1 PATCH, EXTENDED RELEASE TRANSDERMAL at 10:44

## 2023-09-25 RX ADMIN — SODIUM CHLORIDE 1000 ML: 9 INJECTION, SOLUTION INTRAVENOUS at 09:42

## 2023-09-25 RX ADMIN — KETOROLAC TROMETHAMINE 15 MG: 15 INJECTION INTRAMUSCULAR; INTRAVENOUS at 09:42

## 2023-09-25 RX ADMIN — SODIUM CHLORIDE 1000 ML: 9 INJECTION, SOLUTION INTRAVENOUS at 11:51

## 2023-09-25 RX ADMIN — IOPAMIDOL 100 ML: 755 INJECTION, SOLUTION INTRAVENOUS at 13:31

## 2023-09-25 RX ADMIN — SODIUM CHLORIDE 65 ML: 9 INJECTION, SOLUTION INTRAVENOUS at 13:31

## 2023-09-25 ASSESSMENT — ACTIVITIES OF DAILY LIVING (ADL)
ADLS_ACUITY_SCORE: 35
ADLS_ACUITY_SCORE: 35

## 2023-09-25 NOTE — ED TRIAGE NOTES
Hx Chron's and fibromyalgia . C/O severe abdominal pain, n/v/d. . PIV per EMS. Toradol and scopolamine pt requesting. ABC in tact. A/OX4

## 2023-09-25 NOTE — ED NOTES
Pt resting comfortably and aware still waiting for medication patch.  ERT pt resting comfortable for EKG.

## 2023-09-25 NOTE — ED PROVIDER NOTES
History     Chief Complaint:  Abdominal Pain       HPI   Estephanie Brown is a 48 year old female who presents for evaluation of abdominal pain, nausea, vomiting, and diarrhea.  She arrives via EMS.  Her  is also present and provides a portion of the history.   reports symptoms began around midnight.  She felt like she was constipated and went around the block for a walk.  Apparently this morning, pain severely worsened.  Started having nausea, vomiting, and diarrhea.  She has diffuse abdominal pain.  She was seen a few weeks ago here for similar symptoms and a reassuring CT scan.  She states she Crohn disease and had resection years ago and has not had significant complications since then.   reports that she will happens when she has a stricture.  No fevers or chills and no known sick contacts.      Independent Historian:   Spouse/Partner - They report as above    Review of External Notes:   Department visit here dated 9/11/2023 where she had improvement scalp pain, Toradol, and had a reassuring CT scan and discharged home.      Medications:    Acetaminophen 325 MG CAPS  albuterol (PROAIR HFA/PROVENTIL HFA/VENTOLIN HFA) 108 (90 Base) MCG/ACT inhaler  ALLEGRA PO  ALPRAZolam (XANAX) 0.25 MG tablet  amitriptyline (ELAVIL) 10 MG tablet  amLODIPine (NORVASC) 5 MG tablet  Ascorbic Acid (VITAMIN C) 500 MG CHEW  aspirin (ASA) 81 MG chewable tablet  busPIRone (BUSPAR) 10 MG tablet  colestipol (COLESTID) 1 g tablet  Cyanocobalamin 1000 MCG/15ML LIQD  EPINEPHrine (EPIPEN JR) 0.15 MG/0.3ML injection 2-pack  fish oil-omega-3 fatty acids 1000 MG capsule  fluconazole (DIFLUCAN) 150 MG tablet  folic acid (FOLVITE) 1 MG tablet  gabapentin (NEURONTIN) 300 MG capsule  HUMIRA SC  ketoconazole (NIZORAL) 2 % external shampoo  levonorgestrel (MIRENA) 20 MCG/24HR IUD  LEVORA 0.15/30 (28) 0.15-30 MG-MCG PO TABS  loperamide (IMODIUM A-D) 2 MG tablet  losartan (COZAAR) 100 MG tablet  magnesium 250 MG tablet  metFORMIN  (GLUCOPHAGE) 500 MG tablet  multivitamin w/minerals (THERA-VIT-M) tablet  OMEPRAZOLE 10 MG PO CPDR  PROAIR  (90 BASE) MCG/ACT IN AERS  Resorcinol POWD  Resorcinol POWD  Saccharomyces boulardii (PROBIOTIC) 250 MG CAPS  simethicone (MYLICON) 80 MG chewable tablet  spironolactone (ALDACTONE) 100 MG tablet  TYLENOL 325 MG PO TABS  venlafaxine (EFFEXOR-XR) 150 MG 24 hr capsule  VENLAFAXINE HCL 37.5 MG PO TABS  Vitamin D, Cholecalciferol, 25 MCG (1000 UT) TABS  WEGOVY 0.25 MG/0.5ML SOAJ        Past Medical History:    No past medical history on file.    Past Surgical History:    No past surgical history on file.     Physical Exam   Patient Vitals for the past 24 hrs:   BP Pulse Resp SpO2 Weight   09/25/23 0933 -- (!) 121 -- 99 % --   09/25/23 0926 (!) 155/112 -- -- -- --   09/25/23 0925 -- -- 24 -- 99.8 kg (220 lb)        Physical Exam  Constitutional: Non toxic appearing.  HEENT: Atraumatic. Moist mucous membranes.  Neck: Soft.  Supple.    Cardiac: Regular rate and rhythm.  No murmur or rub.  Respiratory: Clear to auscultation bilaterally.  No respiratory distress.  No wheezing, rhonchi, or rales.  Abdomen: Soft with diffuse tenderness to palpation. No guarding.  Nondistended.  Musculoskeletal: No edema.  Normal range of motion.  Neurologic: Alert and oriented.  Normal tone and bulk.  Normal gait.  Skin: No rashes.  No edema.  Psych: Normal affect.  Normal behavior.        Emergency Department Course     ECG results from 09/25/23   EKG 12-lead, tracing only     Value    Systolic Blood Pressure     Diastolic Blood Pressure     Ventricular Rate 100    Atrial Rate 100    OH Interval 128    QRS Duration 86        QTc 448    P Axis 66    R AXIS 24    T Axis 55    Interpretation ECG      Sinus rhythm  Normal ECG  No previous ECGs available  Confirmed by - EMERGENCY ROOM, PHYSICIAN ([id number]),  CHANDRAKANT SIM (Jacob) on 9/25/2023 1:19:29 PM           Imaging:  CT Abdomen Pelvis w Contrast   Final Result    IMPRESSION:    1.  History of Crohn's disease. No CT evidence of active inflammation   or penetrating disease. No bowel obstruction.      2.   Small, possibly reactive ileocolic mesenteric lymph nodes.      LANE QUINN MD            SYSTEM ID:  [id number]         Report per radiology    Laboratory:  Labs Ordered and Resulted from Time of ED Arrival to Time of ED Departure   COMPREHENSIVE METABOLIC PANEL - Abnormal       Result Value    Sodium 137      Potassium 4.4      Chloride 97 (*)     Carbon Dioxide (CO2) 25      Anion Gap 15      Urea Nitrogen 14.3      Creatinine 0.94      Calcium 11.1 (*)     Glucose 157 (*)     Alkaline Phosphatase 83      AST 23      ALT 15      Protein Total 8.6 (*)     Albumin 5.0      Bilirubin Total 0.6      GFR Estimate 75     LACTIC ACID WHOLE BLOOD - Abnormal    Lactic Acid 3.6 (*)    ROUTINE UA WITH MICROSCOPIC REFLEX TO CULTURE - Abnormal    Color Urine Yellow      Appearance Urine Slightly Cloudy (*)     Glucose Urine Negative      Bilirubin Urine Negative      Ketones Urine Trace (*)     Specific Gravity Urine 1.021      Blood Urine Negative      pH Urine 5.5      Protein Albumin Urine Negative      Urobilinogen Urine Normal      Nitrite Urine Negative      Leukocyte Esterase Urine Small (*)     Bacteria Urine Moderate (*)     Mucus Urine Present (*)     RBC Urine 4 (*)     WBC Urine 4      Squamous Epithelials Urine 1      Hyaline Casts Urine 41 (*)     Granular Casts Urine 1 (*)    CBC WITH PLATELETS AND DIFFERENTIAL - Abnormal    WBC Count 22.8 (*)     RBC Count 5.36 (*)     Hemoglobin 17.5 (*)     Hematocrit 49.9 (*)     MCV 93      MCH 32.6      MCHC 35.1      RDW 11.7      Platelet Count 329      % Neutrophils 87      % Lymphocytes 9      % Monocytes 4      % Eosinophils 0      % Basophils 0      % Immature Granulocytes 0      NRBCs per 100 WBC 0      Absolute Neutrophils 19.7 (*)     Absolute Lymphocytes 2.0      Absolute Monocytes 0.9      Absolute Eosinophils  0.1      Absolute Basophils 0.1      Absolute Immature Granulocytes 0.1      Absolute NRBCs 0.0     LIPASE - Normal    Lipase 25     HCG QUALITATIVE PREGNANCY - Normal    hCG Serum Qualitative Negative     LACTIC ACID WHOLE BLOOD - Normal    Lactic Acid 1.7          Procedures       Emergency Department Course & Assessments:             Interventions:  Medications   sodium chloride 0.9% BOLUS 1,000 mL (has no administration in time range)   ketorolac (TORADOL) injection 15 mg (has no administration in time range)   scopolamine (TRANSDERM) 72 hr patch 1 patch (has no administration in time range)     And   scopolamine (TRANSDERM-SCOP) Patch in Place (has no administration in time range)            Independent Interpretation (X-rays, CTs, rhythm strip):  None    Consultations/Discussion of Management or Tests:  None        Social Determinants of Health affecting care:   None    Disposition:  The patient was discharged to home.     Impression & Plan      Medical Decision Making:  Estephanie Brown is a 48-year-old woman who is afebrile and hemodynamically stable.  Her abdomen is soft without acute peritoneal signs.  She had a syncopal episode that seem to be vasovagal secondary to pain and nausea.  I reviewed her recent visit with similar symptoms and reassuring work-up.  Lab work today is reassuring.  CT scan of the abdomen pelvis is unrevealing.  On reevaluation, her symptoms are now gone and she is feeling much improved and requesting discharge home.  She is tolerating sips of water.  She declined any further pain control.  Her abdomen remained soft and benign.  We discussed plan for home with scopolamine patch and close primary care follow-up.  I think it is beneficial for her to be reevaluated by GI again given her recurrent symptoms.  No indication for serial exams given her resolution of symptoms, reassuring work-up, and benign imaging.  She feels very comfortable and is requesting discharge home.  I think that is  reasonable.  Discussed port of care at home and strict return precautions were given.  Questions were answered and she was in no distress at time of discharge.      Diagnosis:    ICD-10-CM    1. Nausea, vomiting, and diarrhea  R11.2     R19.7       2. Generalized abdominal pain  R10.84            Discharge Medications:  Discharge Medication List as of 9/25/2023  2:11 PM        START taking these medications    Details   scopolamine (TRANSDERM) 1 MG/3DAYS 72 hr patch Place 1 patch onto the skin every 72 hours, Disp-10 patch, R-0, E-Prescribe                9/25/2023   Carlos Manuel Piña MD Salay, Nicholas J, MD  09/26/23 1025

## 2023-09-25 NOTE — ED NOTES
Bed: ED13  Expected date: 9/25/23  Expected time: 9:11 AM  Means of arrival:   Comments:  BV2 to a back hallway

## 2023-09-30 ENCOUNTER — HEALTH MAINTENANCE LETTER (OUTPATIENT)
Age: 48
End: 2023-09-30

## 2023-12-09 ENCOUNTER — HEALTH MAINTENANCE LETTER (OUTPATIENT)
Age: 48
End: 2023-12-09

## 2024-11-17 ENCOUNTER — HEALTH MAINTENANCE LETTER (OUTPATIENT)
Age: 49
End: 2024-11-17

## 2025-03-15 ENCOUNTER — APPOINTMENT (OUTPATIENT)
Dept: GENERAL RADIOLOGY | Facility: CLINIC | Age: 50
End: 2025-03-15
Attending: INTERNAL MEDICINE
Payer: COMMERCIAL

## 2025-03-15 ENCOUNTER — APPOINTMENT (OUTPATIENT)
Dept: CT IMAGING | Facility: CLINIC | Age: 50
End: 2025-03-15
Attending: EMERGENCY MEDICINE
Payer: COMMERCIAL

## 2025-03-15 ENCOUNTER — HOSPITAL ENCOUNTER (OUTPATIENT)
Facility: CLINIC | Age: 50
Setting detail: OBSERVATION
Discharge: HOME | End: 2025-03-16
Attending: EMERGENCY MEDICINE | Admitting: STUDENT IN AN ORGANIZED HEALTH CARE EDUCATION/TRAINING PROGRAM
Payer: COMMERCIAL

## 2025-03-15 DIAGNOSIS — K56.600 PARTIAL SMALL BOWEL OBSTRUCTION (H): ICD-10-CM

## 2025-03-15 LAB
ALBUMIN SERPL BCG-MCNC: 4.3 G/DL (ref 3.5–5.2)
ALBUMIN UR-MCNC: NEGATIVE MG/DL
ALP SERPL-CCNC: 65 U/L (ref 40–150)
ALT SERPL W P-5'-P-CCNC: 11 U/L (ref 0–50)
ANION GAP SERPL CALCULATED.3IONS-SCNC: 9 MMOL/L (ref 7–15)
APPEARANCE UR: CLEAR
AST SERPL W P-5'-P-CCNC: 18 U/L (ref 0–45)
BASOPHILS # BLD AUTO: 0 10E3/UL (ref 0–0.2)
BASOPHILS NFR BLD AUTO: 0 %
BILIRUB SERPL-MCNC: 0.4 MG/DL
BILIRUB UR QL STRIP: NEGATIVE
BUN SERPL-MCNC: 14.2 MG/DL (ref 6–20)
CALCIUM SERPL-MCNC: 9.4 MG/DL (ref 8.8–10.4)
CHLORIDE SERPL-SCNC: 101 MMOL/L (ref 98–107)
COLOR UR AUTO: ABNORMAL
CREAT SERPL-MCNC: 0.8 MG/DL (ref 0.51–0.95)
EGFRCR SERPLBLD CKD-EPI 2021: >90 ML/MIN/1.73M2
EOSINOPHIL # BLD AUTO: 0.1 10E3/UL (ref 0–0.7)
EOSINOPHIL NFR BLD AUTO: 0 %
ERYTHROCYTE [DISTWIDTH] IN BLOOD BY AUTOMATED COUNT: 12.1 % (ref 10–15)
GLUCOSE SERPL-MCNC: 91 MG/DL (ref 70–99)
GLUCOSE UR STRIP-MCNC: NEGATIVE MG/DL
HCG SERPL QL: NEGATIVE
HCO3 SERPL-SCNC: 27 MMOL/L (ref 22–29)
HCT VFR BLD AUTO: 42.8 % (ref 35–47)
HGB BLD-MCNC: 14.6 G/DL (ref 11.7–15.7)
HGB UR QL STRIP: ABNORMAL
HOLD SPECIMEN: NORMAL
IMM GRANULOCYTES # BLD: 0.1 10E3/UL
IMM GRANULOCYTES NFR BLD: 0 %
KETONES UR STRIP-MCNC: NEGATIVE MG/DL
LEUKOCYTE ESTERASE UR QL STRIP: ABNORMAL
LIPASE SERPL-CCNC: 25 U/L (ref 13–60)
LYMPHOCYTES # BLD AUTO: 1.5 10E3/UL (ref 0.8–5.3)
LYMPHOCYTES NFR BLD AUTO: 11 %
MCH RBC QN AUTO: 31.7 PG (ref 26.5–33)
MCHC RBC AUTO-ENTMCNC: 34.1 G/DL (ref 31.5–36.5)
MCV RBC AUTO: 93 FL (ref 78–100)
MONOCYTES # BLD AUTO: 0.8 10E3/UL (ref 0–1.3)
MONOCYTES NFR BLD AUTO: 6 %
NEUTROPHILS # BLD AUTO: 11.5 10E3/UL (ref 1.6–8.3)
NEUTROPHILS NFR BLD AUTO: 83 %
NITRATE UR QL: NEGATIVE
NRBC # BLD AUTO: 0 10E3/UL
NRBC BLD AUTO-RTO: 0 /100
PH UR STRIP: 7.5 [PH] (ref 5–7)
PLATELET # BLD AUTO: 263 10E3/UL (ref 150–450)
POTASSIUM SERPL-SCNC: 4 MMOL/L (ref 3.4–5.3)
PROT SERPL-MCNC: 7.4 G/DL (ref 6.4–8.3)
RBC # BLD AUTO: 4.61 10E6/UL (ref 3.8–5.2)
RBC URINE: 2 /HPF
SODIUM SERPL-SCNC: 137 MMOL/L (ref 135–145)
SP GR UR STRIP: 1 (ref 1–1.03)
SQUAMOUS EPITHELIAL: 4 /HPF
UROBILINOGEN UR STRIP-MCNC: NORMAL MG/DL
WBC # BLD AUTO: 13.9 10E3/UL (ref 4–11)
WBC URINE: 3 /HPF

## 2025-03-15 PROCEDURE — 81001 URINALYSIS AUTO W/SCOPE: CPT | Performed by: EMERGENCY MEDICINE

## 2025-03-15 PROCEDURE — 74177 CT ABD & PELVIS W/CONTRAST: CPT

## 2025-03-15 PROCEDURE — 255N000002 HC RX 255 OP 636: Performed by: INTERNAL MEDICINE

## 2025-03-15 PROCEDURE — 258N000003 HC RX IP 258 OP 636: Performed by: STUDENT IN AN ORGANIZED HEALTH CARE EDUCATION/TRAINING PROGRAM

## 2025-03-15 PROCEDURE — 83690 ASSAY OF LIPASE: CPT | Performed by: EMERGENCY MEDICINE

## 2025-03-15 PROCEDURE — 250N000011 HC RX IP 250 OP 636: Performed by: INTERNAL MEDICINE

## 2025-03-15 PROCEDURE — 250N000011 HC RX IP 250 OP 636: Performed by: EMERGENCY MEDICINE

## 2025-03-15 PROCEDURE — 99285 EMERGENCY DEPT VISIT HI MDM: CPT | Mod: 25

## 2025-03-15 PROCEDURE — G0378 HOSPITAL OBSERVATION PER HR: HCPCS

## 2025-03-15 PROCEDURE — 82040 ASSAY OF SERUM ALBUMIN: CPT | Performed by: EMERGENCY MEDICINE

## 2025-03-15 PROCEDURE — 85025 COMPLETE CBC W/AUTO DIFF WBC: CPT | Performed by: EMERGENCY MEDICINE

## 2025-03-15 PROCEDURE — 250N000009 HC RX 250: Performed by: INTERNAL MEDICINE

## 2025-03-15 PROCEDURE — 96361 HYDRATE IV INFUSION ADD-ON: CPT

## 2025-03-15 PROCEDURE — 99222 1ST HOSP IP/OBS MODERATE 55: CPT | Performed by: STUDENT IN AN ORGANIZED HEALTH CARE EDUCATION/TRAINING PROGRAM

## 2025-03-15 PROCEDURE — 250N000009 HC RX 250: Performed by: EMERGENCY MEDICINE

## 2025-03-15 PROCEDURE — 258N000003 HC RX IP 258 OP 636: Performed by: EMERGENCY MEDICINE

## 2025-03-15 PROCEDURE — 99207 PR APP CREDIT; MD BILLING SHARED VISIT: CPT | Performed by: INTERNAL MEDICINE

## 2025-03-15 PROCEDURE — 99204 OFFICE O/P NEW MOD 45 MIN: CPT | Performed by: STUDENT IN AN ORGANIZED HEALTH CARE EDUCATION/TRAINING PROGRAM

## 2025-03-15 PROCEDURE — 250N000013 HC RX MED GY IP 250 OP 250 PS 637: Performed by: INTERNAL MEDICINE

## 2025-03-15 PROCEDURE — 96374 THER/PROPH/DIAG INJ IV PUSH: CPT | Mod: 59

## 2025-03-15 PROCEDURE — 36415 COLL VENOUS BLD VENIPUNCTURE: CPT | Performed by: EMERGENCY MEDICINE

## 2025-03-15 PROCEDURE — 74018 RADEX ABDOMEN 1 VIEW: CPT

## 2025-03-15 PROCEDURE — 84703 CHORIONIC GONADOTROPIN ASSAY: CPT | Performed by: EMERGENCY MEDICINE

## 2025-03-15 PROCEDURE — 96376 TX/PRO/DX INJ SAME DRUG ADON: CPT

## 2025-03-15 RX ORDER — SODIUM CHLORIDE 9 MG/ML
INJECTION, SOLUTION INTRAVENOUS CONTINUOUS
Status: DISCONTINUED | OUTPATIENT
Start: 2025-03-15 | End: 2025-03-16

## 2025-03-15 RX ORDER — ONDANSETRON 2 MG/ML
4 INJECTION INTRAMUSCULAR; INTRAVENOUS EVERY 6 HOURS PRN
Status: DISCONTINUED | OUTPATIENT
Start: 2025-03-15 | End: 2025-03-16 | Stop reason: HOSPADM

## 2025-03-15 RX ORDER — OMEPRAZOLE 20 MG/1
20 CAPSULE, DELAYED RELEASE ORAL
Status: DISCONTINUED | OUTPATIENT
Start: 2025-03-16 | End: 2025-03-15

## 2025-03-15 RX ORDER — IOPAMIDOL 755 MG/ML
500 INJECTION, SOLUTION INTRAVASCULAR ONCE
Status: COMPLETED | OUTPATIENT
Start: 2025-03-15 | End: 2025-03-15

## 2025-03-15 RX ORDER — CHOLECALCIFEROL (VITAMIN D3) 50 MCG
2 TABLET ORAL DAILY
COMMUNITY

## 2025-03-15 RX ORDER — PROCHLORPERAZINE MALEATE 10 MG
10 TABLET ORAL EVERY 6 HOURS PRN
Status: DISCONTINUED | OUTPATIENT
Start: 2025-03-15 | End: 2025-03-16 | Stop reason: HOSPADM

## 2025-03-15 RX ORDER — MAGNESIUM OXIDE 400 MG/1
400 TABLET ORAL DAILY
COMMUNITY

## 2025-03-15 RX ORDER — ALBUTEROL SULFATE 0.83 MG/ML
2.5 SOLUTION RESPIRATORY (INHALATION) EVERY 6 HOURS PRN
Status: DISCONTINUED | OUTPATIENT
Start: 2025-03-15 | End: 2025-03-16 | Stop reason: HOSPADM

## 2025-03-15 RX ORDER — SPIRONOLACTONE 25 MG/1
50 TABLET ORAL 3 TIMES DAILY
Status: DISCONTINUED | OUTPATIENT
Start: 2025-03-15 | End: 2025-03-16 | Stop reason: HOSPADM

## 2025-03-15 RX ORDER — FLUTICASONE PROPIONATE 50 MCG
1 SPRAY, SUSPENSION (ML) NASAL DAILY
COMMUNITY

## 2025-03-15 RX ORDER — KETOROLAC TROMETHAMINE 15 MG/ML
10 INJECTION, SOLUTION INTRAMUSCULAR; INTRAVENOUS ONCE
Status: COMPLETED | OUTPATIENT
Start: 2025-03-15 | End: 2025-03-15

## 2025-03-15 RX ORDER — SPIRONOLACTONE 50 MG/1
50 TABLET, FILM COATED ORAL 3 TIMES DAILY
COMMUNITY

## 2025-03-15 RX ORDER — ONDANSETRON 2 MG/ML
4 INJECTION INTRAMUSCULAR; INTRAVENOUS EVERY 30 MIN PRN
Status: DISCONTINUED | OUTPATIENT
Start: 2025-03-15 | End: 2025-03-15

## 2025-03-15 RX ORDER — AMOXICILLIN 250 MG
2 CAPSULE ORAL 2 TIMES DAILY PRN
Status: DISCONTINUED | OUTPATIENT
Start: 2025-03-15 | End: 2025-03-16 | Stop reason: HOSPADM

## 2025-03-15 RX ORDER — SCOPOLAMINE 1 MG/3D
1 PATCH, EXTENDED RELEASE TRANSDERMAL ONCE
Status: DISCONTINUED | OUTPATIENT
Start: 2025-03-15 | End: 2025-03-16 | Stop reason: HOSPADM

## 2025-03-15 RX ORDER — TRIAMCINOLONE ACETONIDE 1 MG/G
OINTMENT TOPICAL 2 TIMES DAILY PRN
COMMUNITY

## 2025-03-15 RX ORDER — CYANOCOBALAMIN 1000 UG/ML
1000 INJECTION, SOLUTION INTRAMUSCULAR; SUBCUTANEOUS
COMMUNITY

## 2025-03-15 RX ORDER — KETOROLAC TROMETHAMINE 30 MG/ML
30 INJECTION, SOLUTION INTRAMUSCULAR; INTRAVENOUS EVERY 6 HOURS PRN
Status: DISCONTINUED | OUTPATIENT
Start: 2025-03-15 | End: 2025-03-16 | Stop reason: HOSPADM

## 2025-03-15 RX ORDER — ONDANSETRON 4 MG/1
4 TABLET, ORALLY DISINTEGRATING ORAL EVERY 6 HOURS PRN
Status: DISCONTINUED | OUTPATIENT
Start: 2025-03-15 | End: 2025-03-16 | Stop reason: HOSPADM

## 2025-03-15 RX ORDER — AMOXICILLIN 250 MG
1 CAPSULE ORAL 2 TIMES DAILY PRN
Status: DISCONTINUED | OUTPATIENT
Start: 2025-03-15 | End: 2025-03-16 | Stop reason: HOSPADM

## 2025-03-15 RX ORDER — BIOTIN 1 MG
1000 TABLET ORAL DAILY
COMMUNITY

## 2025-03-15 RX ORDER — VENLAFAXINE HYDROCHLORIDE 150 MG/1
150 CAPSULE, EXTENDED RELEASE ORAL DAILY
Status: DISCONTINUED | OUTPATIENT
Start: 2025-03-15 | End: 2025-03-16 | Stop reason: HOSPADM

## 2025-03-15 RX ADMIN — SPIRONOLACTONE 50 MG: 25 TABLET, FILM COATED ORAL at 20:45

## 2025-03-15 RX ADMIN — SODIUM CHLORIDE: 0.9 INJECTION, SOLUTION INTRAVENOUS at 19:16

## 2025-03-15 RX ADMIN — WATER 150 ML: 100 IRRIGANT IRRIGATION at 10:53

## 2025-03-15 RX ADMIN — KETOROLAC TROMETHAMINE 30 MG: 30 INJECTION, SOLUTION INTRAMUSCULAR at 17:18

## 2025-03-15 RX ADMIN — IOPAMIDOL 100 ML: 755 INJECTION, SOLUTION INTRAVENOUS at 03:13

## 2025-03-15 RX ADMIN — SODIUM CHLORIDE: 0.9 INJECTION, SOLUTION INTRAVENOUS at 06:25

## 2025-03-15 RX ADMIN — SCOPOLAMINE 1 PATCH: 1.5 PATCH, EXTENDED RELEASE TRANSDERMAL at 01:49

## 2025-03-15 RX ADMIN — KETOROLAC TROMETHAMINE 10 MG: 15 INJECTION, SOLUTION INTRAMUSCULAR; INTRAVENOUS at 01:41

## 2025-03-15 RX ADMIN — VENLAFAXINE HYDROCHLORIDE 150 MG: 150 CAPSULE, EXTENDED RELEASE ORAL at 11:44

## 2025-03-15 RX ADMIN — SODIUM CHLORIDE 1000 ML: 0.9 INJECTION, SOLUTION INTRAVENOUS at 01:33

## 2025-03-15 ASSESSMENT — ACTIVITIES OF DAILY LIVING (ADL)
ADLS_ACUITY_SCORE: 18
ADLS_ACUITY_SCORE: 18
ADLS_ACUITY_SCORE: 41
ADLS_ACUITY_SCORE: 18
ADLS_ACUITY_SCORE: 41
ADLS_ACUITY_SCORE: 18
ADLS_ACUITY_SCORE: 18
ADLS_ACUITY_SCORE: 41
ADLS_ACUITY_SCORE: 41
ADLS_ACUITY_SCORE: 18
ADLS_ACUITY_SCORE: 18
ADLS_ACUITY_SCORE: 41
ADLS_ACUITY_SCORE: 18
ADLS_ACUITY_SCORE: 41
ADLS_ACUITY_SCORE: 41
ADLS_ACUITY_SCORE: 18
ADLS_ACUITY_SCORE: 41
ADLS_ACUITY_SCORE: 41

## 2025-03-15 ASSESSMENT — COLUMBIA-SUICIDE SEVERITY RATING SCALE - C-SSRS
6. HAVE YOU EVER DONE ANYTHING, STARTED TO DO ANYTHING, OR PREPARED TO DO ANYTHING TO END YOUR LIFE?: NO
1. IN THE PAST MONTH, HAVE YOU WISHED YOU WERE DEAD OR WISHED YOU COULD GO TO SLEEP AND NOT WAKE UP?: NO
2. HAVE YOU ACTUALLY HAD ANY THOUGHTS OF KILLING YOURSELF IN THE PAST MONTH?: NO

## 2025-03-15 NOTE — PLAN OF CARE
"/72 (BP Location: Right arm)   Pulse 76   Temp 97.4  F (36.3  C) (Oral)   Resp 18   Wt 95.8 kg (211 lb 1.6 oz)   SpO2 96%   BMI 30.29 kg/m      Neuro: a/o x4  Cardiac: WNL  Lungs: WNL  GI: constipation  : WNL  Pain: denies  IV: NS @75  Meds: effexor  Diet: NPO ex: ice/meds  Activity: independent   Misc: surg consult.   Plan: xray for Gastrogaffin to be done this evening. Currently resting in bed, able to make need known.           Problem: Adult Inpatient Plan of Care  Goal: Plan of Care Review  Description: The Plan of Care Review/Shift note should be completed every shift.  The Outcome Evaluation is a brief statement about your assessment that the patient is improving, declining, or no change.  This information will be displayed automatically on your shift  note.  Outcome: Progressing  Flowsheets (Taken 3/15/2025 1630)  Outcome Evaluation: xray for gastrogafin this evening  Plan of Care Reviewed With: patient  Overall Patient Progress: improving  Goal: Patient-Specific Goal (Individualized)  Description: You can add care plan individualizations to a care plan. Examples of Individualization might be:  \"Parent requests to be called daily at 9am for status\", \"I have a hard time hearing out of my right ear\", or \"Do not touch me to wake me up as it startles  me\".  Outcome: Progressing  Goal: Absence of Hospital-Acquired Illness or Injury  Outcome: Progressing  Intervention: Identify and Manage Fall Risk  Recent Flowsheet Documentation  Taken 3/15/2025 1015 by Lilia Alston RN  Safety Promotion/Fall Prevention:   assistive device/personal items within reach   clutter free environment maintained   nonskid shoes/slippers when out of bed   safety round/check completed  Intervention: Prevent Skin Injury  Recent Flowsheet Documentation  Taken 3/15/2025 1015 by Lilia Alston RN  Body Position: position changed independently  Goal: Optimal Comfort and Wellbeing  Outcome: Progressing  Goal: Readiness for " Transition of Care  Outcome: Progressing  Intervention: Mutually Develop Transition Plan  Recent Flowsheet Documentation  Taken 3/15/2025 1020 by Lilia Alston, RN  Equipment Currently Used at Home: none     Problem: Intestinal Obstruction  Goal: Optimal Bowel Function  Outcome: Progressing  Intervention: Promote Bowel Function  Recent Flowsheet Documentation  Taken 3/15/2025 1015 by Lilia Alston, RN  Body Position: position changed independently  Head of Bed (HOB) Positioning: HOB at 20-30 degrees  Positioning/Transfer Devices:   pillows   in use  Goal: Fluid and Electrolyte Balance  Outcome: Progressing  Goal: Absence of Infection Signs and Symptoms  Outcome: Progressing  Goal: Optimize Nutrition Status  Outcome: Progressing  Goal: Optimal Pain Control and Function  Outcome: Progressing         Goal Outcome Evaluation:      Plan of Care Reviewed With: patient    Overall Patient Progress: improvingOverall Patient Progress: improving    Outcome Evaluation: xray for gastrogafin this evening

## 2025-03-15 NOTE — H&P
[facility]    History and Physical - Hospitalist Service       Date of Admission:  3/15/2025    Assessment & Plan      Estephanie Brown is a 49 year old female medical history significant for Crohn's disease status post surgical resection about 20 years ago (I do not have the details about the surgery), currently not on any treatment for Crohn's disease, in remission for about 5 years, hypertension, obesity, pression/anxiety who presented to the ER with complaint of abdominal pain, nausea and vomiting and was found to have partial small bowel obstruction.     Acute partial small bowel obstruction  Presented with 1 day history of nausea, vomiting, abdominal pain, and not passing gases.  Her last bowel movement was on Friday 3/14.  Hemodynamically stable.  Abdominal exam is reassuring.  Bowel sounds are present.  Labs overall reassuring except mild leukocytosis at 13.9 CT abdomen and pelvis showed mild distal ileal wall thickening at the level of ileocolic anastomosis with associated upstream multiple dilated small bowel loops, concerning for partial small bowel obstruction along with a small amount of free fluid in the pelvis which could be physiologic or reactive.    -Patient reported that she has not vomited for the past several hours so would hold off NG at this time  - N.p.o.  - Start IV normal saline at 75 mL/h  - General Surgery consultation      Crohn's disease  Per patient she is in remission and has not been on any medication since 2020.    Hypertension  Resume prior to admission amlodipine, losartan, spironolactone after pharmacy med reconciliation      Obesity  On metformin, hold while patient on the observation, resume upon discharge    Depression/anxiety  Resume prior to admission medication after pharmacy med reconciliation       Observation Goals: -diagnostic tests and consults completed and resulted, -vital signs normal or at patient baseline, -tolerating oral intake to maintain  hydration, Nurse to notify provider when observation goals have been met and patient is ready for discharge.  Diet: NPO for Medical/Clinical Reasons Except for: Ice Chips, Meds    DVT Prophylaxis: Ambulate every shift  Newby Catheter: Not present  Lines: None     Cardiac Monitoring: None  Code Status: Full Code      Clinically Significant Risk Factors Present on Admission                 # Drug Induced Platelet Defect: home medication list includes an antiplatelet medication   # Hypertension: Home medication list includes antihypertensive(s)                      Disposition Plan     Medically Ready for Discharge: Anticipated Tomorrow           Sheri Tamez MD  Hospitalist Service  [facility]  Securely message with crossvertise (more info)  Text page via [facility] Paging/Directory     ______________________________________________________________________    Chief Complaint   Abdominal  pain, nausea, vomiting    History is obtained from the patient, ER doctor, and the chart review    History of Present Illness     reema Brown is a 49 year old female medical history significant for Crohn's disease status post surgical resection about 20 years ago (I do not have the details about the surgery), currently not on any treatment for Crohn's disease, in remission for about 5 years, hypertension, obesity, pression/anxiety who presented to the ER with complaint of abdominal pain, nausea and vomiting and was found to have partial small bowel obstruction.    Patient reported that her last bowel movement was on Friday 3/14.  During the day on Friday she stopped passing gases which is unusual for her.  Later in the day she started developing nausea, vomiting, and abdominal pain.  Given worsening of her symptoms she decided to come to the ER for further evaluation.  She has a history of Crohn's disease and is status post surgical resection.  I do not have details of the prior surgery.  Patient reported that the surgery was  about 20 years ago.  Patient has been on multiple medications in the past for Crohn's disease now in remission for at least since .      Past Medical History    No past medical history on file.    Past Surgical History   No past surgical history on file.    Prior to Admission Medications   Prior to Admission Medications   Prescriptions Last Dose Informant Patient Reported? Taking?   ALLEGRA PO   Yes No   Simg bid   ALPRAZolam (XANAX) 0.25 MG tablet   Yes No   Acetaminophen 325 MG CAPS   Yes No   Sig: Take 325 mg by mouth as needed   Ascorbic Acid (VITAMIN C) 500 MG CHEW   Yes No   Sig: daily   Cyanocobalamin 1000 MCG/15ML LIQD   Yes No   Sig: every 14 days   EPINEPHrine (EPIPEN JR) 0.15 MG/0.3ML injection 2-pack   Yes No   Sig: as needed   HUMIRA SC   Yes No   Sig: None Entered   LEVORA 0.15/30 (28) 0.15-30 MG-MCG PO TABS   Yes No   Si TABLET DAILY   OMEPRAZOLE 10 MG PO CPDR   Yes No   Sig: None Entered   PROAIR  (90 BASE) MCG/ACT IN AERS   No No   Sig: INHALE  2 PUFFS EVERY 4 TO 6 HOURS AS NEEDED   Resorcinol POWD   No No   Sig: Resorcinol 15% in vancream twice a day for 30 days to chronic areas and then twice a day as needed for flares   Resorcinol POWD   No No   Sig: Resorcinol 15% in vancream twice a day for 30 days to chronic areas and then twice a day as needed for flares   Saccharomyces boulardii (PROBIOTIC) 250 MG CAPS   Yes No   Sig: daily   TYLENOL 325 MG PO TABS   Yes No   Si TABLETS EVERY 4 HOURS AS NEEDED   VENLAFAXINE HCL 37.5 MG PO TABS   Yes No   Sig: bid   Vitamin D, Cholecalciferol, 25 MCG (1000 UT) TABS   Yes No   Sig: daily   WEGOVY 0.25 MG/0.5ML SOAJ   Yes No   albuterol (PROAIR HFA/PROVENTIL HFA/VENTOLIN HFA) 108 (90 Base) MCG/ACT inhaler   Yes No   amLODIPine (NORVASC) 5 MG tablet   Yes No   Sig: daily   amitriptyline (ELAVIL) 10 MG tablet   Yes No   Sig: daily   aspirin (ASA) 81 MG chewable tablet   Yes No   busPIRone (BUSPAR) 10 MG tablet   Yes No   Sig: 3 times daily    colestipol (COLESTID) 1 g tablet   Yes No   Sig: daily   fish oil-omega-3 fatty acids 1000 MG capsule   Yes No   Sig: daily   fluconazole (DIFLUCAN) 150 MG tablet   Yes No   Sig: daily   folic acid (FOLVITE) 1 MG tablet   Yes No   Sig: daily   gabapentin (NEURONTIN) 300 MG capsule   Yes No   Sig: 3 times daily   ketoconazole (NIZORAL) 2 % external shampoo   Yes No   levonorgestrel (MIRENA) 20 MCG/24HR IUD   Yes No   loperamide (IMODIUM A-D) 2 MG tablet   Yes No   Sig: daily   losartan (COZAAR) 100 MG tablet   Yes No   Sig: daily   magnesium 250 MG tablet   Yes No   Sig: daily   metFORMIN (GLUCOPHAGE) 500 MG tablet   Yes No   Si times daily (with meals)   multivitamin w/minerals (THERA-VIT-M) tablet   Yes No   Sig: daily   scopolamine (TRANSDERM) 1 MG/3DAYS 72 hr patch   No No   Sig: Place 1 patch onto the skin every 72 hours   simethicone (MYLICON) 80 MG chewable tablet   Yes No   Sig: daily   spironolactone (ALDACTONE) 100 MG tablet   No No   Sig: Take 1 tablet (100 mg) by mouth daily   venlafaxine (EFFEXOR-XR) 150 MG 24 hr capsule   Yes No   Sig: daily      Facility-Administered Medications Last Administration Doses Remaining   triamcinolone acetonide (KENALOG-10) injection 40 mg None recorded 1           Review of Systems    The 10 point Review of Systems is negative other than noted in the HPI or here.      Physical Exam   Vital Signs: Temp: 97.8  F (36.6  C) Temp src: Temporal BP: (!) 127/100 Pulse: 88   Resp: 18 SpO2: 99 %      Weight: 212 lbs 15.43 oz    Constitutional: awake, alert, cooperative, no apparent distress, and appears stated age  Eyes: Anicteric sclera  ENT: normocephalic, without obvious abnormality  Respiratory: On room air, equal air entry bilaterally  Cardiovascular: Normal rate, regular rhythm, no murmur  GI: Soft, nontender, nondistended, bowel sounds present  Musculoskeletal: no lower extremity pitting edema present  Neurologic: Awake, alert, oriented x 3  Neuropsychiatric:  Appropriate mood and affect    Medical Decision Making       65 MINUTES SPENT BY ME on the date of service doing chart review, history, exam, documentation & further activities per the note.      Data   ------------------------- PAST 24 HR DATA REVIEWED -----------------------------------------------

## 2025-03-15 NOTE — PROGRESS NOTES
Update progress note    Patient seen after midnight by my partner Dr Tamez, this is a nonbillable note.      Currently patient denies n/v, has some abdomen pain, is sleepy.    H&P reviewed, agree with assessment/plan per Dr Tamez      Estephanie Brown is a 49 year old female medical history significant for Crohn's disease status post surgical resection about 20 years ago (I do not have the details about the surgery), currently not on any treatment for Crohn's disease, in remission for about 5 years, hypertension, obesity, pression/anxiety who presented to the ER with complaint of abdominal pain, nausea and vomiting and was found to have partial small bowel obstruction.      Acute partial small bowel obstruction  Presented with 1 day history of nausea, vomiting, abdominal pain, and not passing gases.  Her last bowel movement was on Friday 3/14.  Hemodynamically stable.  Abdominal exam is reassuring.  Bowel sounds are present.  Labs overall reassuring except mild leukocytosis at 13.9 CT abdomen and pelvis showed mild distal ileal wall thickening at the level of ileocolic anastomosis with associated upstream multiple dilated small bowel loops, concerning for partial small bowel obstruction along with a small amount of free fluid in the pelvis which could be physiologic or reactive.     -Patient reported that she has not vomited for the past several hours so would hold off NG at this time but if gets worse would place  - N.p.o.  - Start IV normal saline at 75 mL/h  - General Surgery consulted  --get a gastrografin challenge        Crohn's disease  Per patient she is in remission and has not been on any medication since 2020.     Hypertension  Resume prior to admission amlodipine, losartan, spironolactone held as blood pressure on lower side        Morbidly Obesity  BMI >30, complicates all aspects of her care.   On metformin, hold while patient on the observation, resume upon discharge     Depression/anxiety  Resume prior to  admission medication after pharmacy med reconciliation. Has been on buspar/effexor    Medically Ready for Discharge: Anticipated Tomorrow, if able to eat and no further signs/symptoms of obstruction      Ace Dupree MD

## 2025-03-15 NOTE — CONSULTS
General Surgery Consultation    Estephanie Brown MRN# [id number]   Age: 49 year old YOB: 1975     Date of Admission:  3/15/2025    Reason for consult:            Abdominal pain       Requesting physician:            Joi                                                                                                                                                                                                                                                                                                                                                                                                                                                                                                                                                                                                                                                                                                                                                                                                                                                                                                                                                                                                                                                                                                                                                                                                                                                                                                                                                                                                                                                                                                                                                                                                                                                                                                                                                                                                                                                                                                                                                                                                                                                                                                                                                                                                                                                                                                                                                                                                                                                                                                                                                                                                                                                                                                                                                                                                                                                                                                                                                                                                                                                                                                                                                        Assessment and Plan:   Assessment:   Estephanie Brown is a 49 year old female with history of Crohns disease not taking medication and ileocecectomy in 2006 with a small bowel obstruction.She had not had a bowel obstruction since her surgery in 2006 but developed crampying abdominal pain and nausea/vomiting and no flatus for the past 24 hours. She has imaging suggestive of SBO with transition at her anastomosis.     Comorbidities:  Crohns      Plan:   Agree with gastrograffin  Can defer NGT for now  Outpatient consult to GI  Outpatient would recommend colonoscopy to assess the anastomosis for stenosis in 4 weeks            Chief  Complaint:   Abdominal pain    History is obtained from the patient         History of Present Illness:   Estephanie Brown is a 49 year old female who presents with abdominal pain paraumbilically for the past 1 day.  The pain is intermittent and cramping.  Associated symptoms include with anorexia, nausea, vomiting, and obstipation. Last bowel movement was yesterday morning.  Last flatus was yesterday.  At baseline BMs are regular.  She has had a colonoscopy in 2020.  She does have a history of bowel obstructions in the past.            Past Medical History:    has no past medical history on file.          Past Surgical History:   No past surgical history on file.          Social History:     Social History     Tobacco Use    Smoking status: Never    Smokeless tobacco: Never   Substance Use Topics    Alcohol use: Not on file             Family History:   Family history reviewed and is not pertinent.         Allergies:     Allergies   Allergen Reactions    Birch Trees Dermatitis, Difficulty breathing, Hives, Itching, Shortness Of Breath and Swelling    Hydromorphone Other (See Comments)     Significant constipation    Mangifera Indica Fruit Ext (Hemant)  [Mangifera Indica] Difficulty breathing, Hives, Itching and Swelling    Medical Adhesive Remover Dermatitis, Hives, Itching and Swelling    Metformin Other (See Comments)    Morphine Itching, Nausea and Nausea and Vomiting    Morphine Unknown    Latex Dermatitis, Hives, Itching and Rash             Medications:     Current Facility-Administered Medications   Medication Dose Route Frequency Provider Last Rate Last Admin    albuterol (PROVENTIL) neb solution 2.5 mg  2.5 mg Nebulization Q6H PRN Ace Dupree MD        diatrizoate meglumine-sodium (GASTROGRAFIN) 120 mL in sterile water (bottle) 150 mL  150 mL Oral Once Ace Dupree MD        [START ON 3/16/2025] omeprazole (PriLOSEC) CR capsule 20 mg  20 mg Oral QAM AC Ace Dupree MD        ondansetron  (ZOFRAN ODT) ODT tab 4 mg  4 mg Oral Q6H PRN Sheri Tamez MD        Or    ondansetron (ZOFRAN) injection 4 mg  4 mg Intravenous Q6H PRN Sheri Tamez MD        prochlorperazine (COMPAZINE) injection 10 mg  10 mg Intravenous Q6H PRN Sheri Tamez MD        Or    prochlorperazine (COMPAZINE) tablet 10 mg  10 mg Oral Q6H PRN Sheri Tamez MD        scopolamine (TRANSDERM) 72 hr patch 1 patch  1 patch Transdermal Once Ron Umanzor MD   1 patch at 03/15/25 0149    And    scopolamine (TRANSDERM-SCOP) Patch in Place   Transdermal Q8H Ron Umanzor MD        senna-docusate (SENOKOT-S/PERICOLACE) 8.6-50 MG per tablet 1 tablet  1 tablet Oral BID PRN Sheri Tamez MD        Or    senna-docusate (SENOKOT-S/PERICOLACE) 8.6-50 MG per tablet 2 tablet  2 tablet Oral BID PRN Sheri Tamez MD        sodium chloride 0.9 % infusion   Intravenous Continuous Sheri Tamez MD 75 mL/hr at 03/15/25 1003 Rate Verify at 03/15/25 1003    triamcinolone acetonide (KENALOG-10) injection 40 mg  40 mg Intradermal Once Caio Coronado MD         Current Outpatient Medications   Medication Sig Dispense Refill    Acetaminophen 325 MG CAPS Take 325 mg by mouth as needed      albuterol (PROAIR HFA/PROVENTIL HFA/VENTOLIN HFA) 108 (90 Base) MCG/ACT inhaler       ALLEGRA PO 60mg bid      ALPRAZolam (XANAX) 0.25 MG tablet       amitriptyline (ELAVIL) 10 MG tablet daily      amLODIPine (NORVASC) 5 MG tablet daily      Ascorbic Acid (VITAMIN C) 500 MG CHEW daily      aspirin (ASA) 81 MG chewable tablet       busPIRone (BUSPAR) 10 MG tablet 3 times daily      colestipol (COLESTID) 1 g tablet daily      Cyanocobalamin 1000 MCG/15ML LIQD every 14 days      EPINEPHrine (EPIPEN JR) 0.15 MG/0.3ML injection 2-pack as needed      fish oil-omega-3 fatty acids 1000 MG capsule daily      fluconazole (DIFLUCAN) 150 MG tablet daily      folic acid (FOLVITE) 1 MG tablet daily      gabapentin (NEURONTIN) 300 MG capsule 3 times daily      HUMIRA SC None Entered       ketoconazole (NIZORAL) 2 % external shampoo       levonorgestrel (MIRENA) 20 MCG/24HR IUD       LEVORA 0.15/30 (28) 0.15-30 MG-MCG PO TABS 1 TABLET DAILY      loperamide (IMODIUM A-D) 2 MG tablet daily      losartan (COZAAR) 100 MG tablet daily      magnesium 250 MG tablet daily      metFORMIN (GLUCOPHAGE) 500 MG tablet 2 times daily (with meals)      multivitamin w/minerals (THERA-VIT-M) tablet daily      OMEPRAZOLE 10 MG PO CPDR None Entered      PROAIR  (90 BASE) MCG/ACT IN AERS INHALE  2 PUFFS EVERY 4 TO 6 HOURS AS NEEDED 2 Inhaler 0    Resorcinol POWD Resorcinol 15% in vancream twice a day for 30 days to chronic areas and then twice a day as needed for flares 30 g 3    Resorcinol POWD Resorcinol 15% in vancream twice a day for 30 days to chronic areas and then twice a day as needed for flares 30 g 3    Saccharomyces boulardii (PROBIOTIC) 250 MG CAPS daily      scopolamine (TRANSDERM) 1 MG/3DAYS 72 hr patch Place 1 patch onto the skin every 72 hours 10 patch 0    simethicone (MYLICON) 80 MG chewable tablet daily      spironolactone (ALDACTONE) 100 MG tablet Take 1 tablet (100 mg) by mouth daily 90 tablet 1    TYLENOL 325 MG PO TABS 2 TABLETS EVERY 4 HOURS AS NEEDED      venlafaxine (EFFEXOR-XR) 150 MG 24 hr capsule daily      VENLAFAXINE HCL 37.5 MG PO TABS bid      Vitamin D, Cholecalciferol, 25 MCG (1000 UT) TABS daily      WEGOVY 0.25 MG/0.5ML SOAJ        Current Facility-Administered Medications   Medication Dose Route Frequency Provider Last Rate Last Admin    diatrizoate meglumine-sodium (GASTROGRAFIN) 120 mL in sterile water (bottle) 150 mL  150 mL Oral Once Ace Dupree MD        [START ON 3/16/2025] omeprazole (PriLOSEC) CR capsule 20 mg  20 mg Oral QAM AC Ace Dupree MD        scopolamine (TRANSDERM) 72 hr patch 1 patch  1 patch Transdermal Once Ron Umanzor MD   1 patch at 03/15/25 0149    And    scopolamine (TRANSDERM-SCOP) Patch in Place   Transdermal Q8H Ron Umanzor  MD REVA        triamcinolone acetonide (KENALOG-10) injection 40 mg  40 mg Intradermal Once Cliff, Caio Espino MD                Review of Systems:   The 10 point review of systems is negative other than noted in the HPI.          Physical Exam:   /86   Pulse 75   Temp 98.3  F (36.8  C) (Oral)   Resp 16   Wt 96.6 kg (212 lb 15.4 oz)   SpO2 99%   BMI 30.56 kg/m    General - Well developed, well nourished female in no apparent distress  HEENT:  Head normocephalic and atraumatic, pupils equal and round, conjunctivae clear, no scleral icterus, mucous membranes moist, external ears and nose normal  Neck: Supple without thyromegaly or masses  Lymphatic: No cervical, or supraclavicular lymphadenopathy  Lungs: Clear to auscultation bilaterally  Heart: regular rate and rhythm, no murmurs  Abdomen:   soft, obese, mildly distended with mild tenderness noted diffusely.  No rebound or guarding.  no masses palpated.  Extremities: Warm without edema  Neurologic: nonfocal  Psychiatric: Mood and affect appropriate  Skin: Without lesions, rashes, or juandice         Data:     Lab Results   Component Value Date    WBC 13.9 03/15/2025     Lab Results   Component Value Date    HGB 14.6 03/15/2025     Lab Results   Component Value Date     03/15/2025     Last Basic Metabolic Panel:  Lab Results   Component Value Date     03/15/2025      Lab Results   Component Value Date    POTASSIUM 4.0 03/15/2025    POTASSIUM 3.6 06/22/2022     Lab Results   Component Value Date    CHLORIDE 101 03/15/2025    CHLORIDE 105 06/22/2022     Lab Results   Component Value Date    MOUNA 9.4 03/15/2025     Lab Results   Component Value Date    CO2 27 03/15/2025    CO2 27 06/22/2022     Lab Results   Component Value Date    BUN 14.2 03/15/2025    BUN 8 06/22/2022     Lab Results   Component Value Date    CR 0.80 03/15/2025     Lab Results   Component Value Date    GLC 91 03/15/2025    GLC 88 06/22/2022         Imaging:  All imaging  studies reviewed by me.    Results for orders placed or performed during the hospital encounter of 03/15/25   CT Abdomen Pelvis w Contrast    Narrative    EXAM: CT ABDOMEN PELVIS W CONTRAST  LOCATION: [facility]  DATE: 03/15/2025    INDICATION: Abdominal pain, vomiting.  COMPARISON: None.  TECHNIQUE: CT scan of the abdomen and pelvis was performed after the injection of 100 mL Isovue 370 intravenously. Multiplanar reformats were obtained. Dose reduction techniques were used.    FINDINGS:   LOWER CHEST: Lung bases are clear.      ABDOMEN/PELVIS:  HEPATOBILIARY: No suspicious focal hepatic lesion. No radiodense gallstones.    PANCREAS: No main pancreatic ductal dilatation or definite solid pancreatic mass.    SPLEEN: No splenomegaly.    ADRENAL GLANDS: No adrenal nodules.    KIDNEYS/BLADDER: No radiodense kidney/ureteral stones or hydronephrosis in either kidney.    BOWEL: Postsurgical changes of ileocolonic anastomosis. Mild distal ileal wall thickening at the level of the ileocolic anastomosis with associated upstream multiple dilated small bowel loops, findings worrisome for at least partial small bowel   obstruction.    PERITONEUM: Small amount of free fluid in the pelvis, indeterminate, could be physiological or reactive.    PELVIC ORGANS: An IUD is visualized in appropriate position within the endometrial cavity.    VASCULATURE: Unremarkable.    LYMPH NODES: No significant abdominopelvic lymphadenopathy.    MUSCULOSKELETAL: No suspicious osseous lesion.      Impression    IMPRESSION:  1.  Postsurgical changes of ileocolonic anastomosis. There is mild distal ileal wall thickening at the level of the ileocolic anastomosis with associated upstream multiple dilated small bowel loops, findings worrisome for at least partial small bowel   obstruction.  2.  Small amount of free fluid in the pelvis, indeterminate, could be physiological or reactive.       This note was created using voice  recognition software. Undetected word substitutions or other errors may have occurred.     Teena Stovall MD    Time spent with the patient, reviewing the EMR, reviewing laboratory and imaging studies, more than 50% of which was counseling and coordinating care:  30 minutes.

## 2025-03-15 NOTE — ED NOTES
[facility]    ED Boarding Nurse Handoff Addendum Report:    Date/time: 3/15/2025, 10:10 AM    Activity Level: standby    Fall Risk: No    Active Infusions: Yes    Current Meds Due: Yes-     Current care needs: Gastro challenge    Oxygen requirements (liters/min and/or FiO2): No    Respiratory status: Room air    Vital signs (within last 30 minutes):    Vitals:    03/15/25 0105 03/15/25 0601 03/15/25 1000   BP: (!) 127/100 123/75 129/86   BP Location:  Right arm    Pulse: 88 73 75   Resp: 18 16    Temp: 97.8  F (36.6  C) 98.3  F (36.8  C)    TempSrc: Temporal Oral    SpO2: 99% 98% 99%   Weight: 96.6 kg (212 lb 15.4 oz)         Focused assessment within last 30 minutes:    Pt A&O x4. NPO- Mild to no pain. SBA with amb.  NS x 75 ml / hr    ED Boarding Nurse name: Kiley Correa RN

## 2025-03-15 NOTE — ED PROVIDER NOTES
"  Emergency Department Note      History of Present Illness     Chief Complaint  Abdominal Pain    HPI  Estephanie Brown is a 49 year old female with history of Crohn's disease who presents to the ED  with her  for evaluation of abdominal pain. She reports having abdominal pain (gastroparesis) since 1900 this evening. It occurs in waves which is normal for her along with nausea and vomiting. She began to have a subjective fever which isn't normal. She felt fine before her symptoms. Denies diarrhea, cough, sore throat, shortness of breath, or urinary symptoms.  Her last episode with his pain was last summer. Patient mentions her menstrual cycle is \"harder\" than normal.     Independent Historian  None    Review of External Notes  I reviewed the primary care telemedicine visit from 2/13/2025.  Past Medical History   Medical History and Problem List   Esophageal reflux  Vitamin B12 deficiency  Anxiety  Crohn's disease   Fibromyalgia  STEFAN  Panic disorder  Hidradenitis suppurativa  Major depression   Hypertension    Medications   Albuterol inhaler  Xanax  Elavil  Norvasc  Buspar  Colestid  Diflucan  Gabapentin  IUD  [place]  Metformin  Prilosec   Aldactone  Venlafaxine  WeGovy    Surgical History   Tonsillectomy  Ileoceceal resection  Physical Exam   Patient Vitals for the past 24 hrs:   BP Temp Temp src Pulse Resp SpO2 Weight   03/15/25 0601 123/75 98.3  F (36.8  C) Oral 73 16 98 % --   03/15/25 0105 (!) 127/100 97.8  F (36.6  C) Temporal 88 18 99 % 96.6 kg (212 lb 15.4 oz)     Physical Exam  Vitals and nursing note reviewed.   Constitutional:       General: She is not in acute distress.     Appearance: She is ill-appearing. She is not toxic-appearing.   HENT:      Head: Normocephalic and atraumatic.      Right Ear: External ear normal.      Left Ear: External ear normal.      Nose: Nose normal.      Mouth/Throat:      Mouth: Mucous membranes are moist.   Eyes:      Extraocular Movements: Extraocular movements " intact.      Conjunctiva/sclera: Conjunctivae normal.   Cardiovascular:      Rate and Rhythm: Normal rate and regular rhythm.      Heart sounds: No murmur heard.  Pulmonary:      Effort: Pulmonary effort is normal. No respiratory distress.      Breath sounds: Normal breath sounds. No wheezing, rhonchi or rales.   Abdominal:      General: Abdomen is flat. Bowel sounds are normal. There is no distension.      Palpations: Abdomen is soft.      Tenderness: There is generalized abdominal tenderness (mild). There is no guarding or rebound.   Musculoskeletal:         General: No deformity or signs of injury.      Cervical back: Normal range of motion and neck supple.   Skin:     General: Skin is warm and dry.      Findings: No rash.   Neurological:      Mental Status: She is alert and oriented to person, place, and time.   Psychiatric:         Behavior: Behavior normal.           Diagnostics   Lab Results   Labs Ordered and Resulted from Time of ED Arrival to Time of ED Departure   ROUTINE UA WITH MICROSCOPIC REFLEX TO CULTURE - Abnormal       Result Value    Color Urine Straw      Appearance Urine Clear      Glucose Urine Negative      Bilirubin Urine Negative      Ketones Urine Negative      Specific Gravity Urine 1.005      Blood Urine Moderate (*)     pH Urine 7.5 (*)     Protein Albumin Urine Negative      Urobilinogen Urine Normal      Nitrite Urine Negative      Leukocyte Esterase Urine Trace (*)     RBC Urine 2      WBC Urine 3      Squamous Epithelials Urine 4 (*)    CBC WITH PLATELETS AND DIFFERENTIAL - Abnormal    WBC Count 13.9 (*)     RBC Count 4.61      Hemoglobin 14.6      Hematocrit 42.8      MCV 93      MCH 31.7      MCHC 34.1      RDW 12.1      Platelet Count 263      % Neutrophils 83      % Lymphocytes 11      % Monocytes 6      % Eosinophils 0      % Basophils 0      % Immature Granulocytes 0      NRBCs per 100 WBC 0      Absolute Neutrophils 11.5 (*)     Absolute Lymphocytes 1.5      Absolute Monocytes  0.8      Absolute Eosinophils 0.1      Absolute Basophils 0.0      Absolute Immature Granulocytes 0.1      Absolute NRBCs 0.0     COMPREHENSIVE METABOLIC PANEL - Normal    Sodium 137      Potassium 4.0      Carbon Dioxide (CO2) 27      Anion Gap 9      Urea Nitrogen 14.2      Creatinine 0.80      GFR Estimate >90      Calcium 9.4      Chloride 101      Glucose 91      Alkaline Phosphatase 65      AST 18      ALT 11      Protein Total 7.4      Albumin 4.3      Bilirubin Total 0.4     LIPASE - Normal    Lipase 25     HCG QUALITATIVE PREGNANCY - Normal    hCG Serum Qualitative Negative         Imaging  CT Abdomen Pelvis w Contrast   Final Result   IMPRESSION:   1.  Postsurgical changes of ileocolonic anastomosis. There is mild distal ileal wall thickening at the level of the ileocolic anastomosis with associated upstream multiple dilated small bowel loops, findings worrisome for at least partial small bowel    obstruction.   2.  Small amount of free fluid in the pelvis, indeterminate, could be physiological or reactive.        Report per radiology    Independent Interpretation  None  ED Course    Medications Administered  Medications   scopolamine (TRANSDERM) 72 hr patch 1 patch (1 patch Transdermal $Patch/Med Applied 3/15/25 0149)     And   scopolamine (TRANSDERM-SCOP) Patch in Place (has no administration in time range)   sodium chloride 0.9 % infusion (has no administration in time range)   ondansetron (ZOFRAN ODT) ODT tab 4 mg (has no administration in time range)     Or   ondansetron (ZOFRAN) injection 4 mg (has no administration in time range)   prochlorperazine (COMPAZINE) injection 10 mg (has no administration in time range)     Or   prochlorperazine (COMPAZINE) tablet 10 mg (has no administration in time range)   senna-docusate (SENOKOT-S/PERICOLACE) 8.6-50 MG per tablet 1 tablet (has no administration in time range)     Or   senna-docusate (SENOKOT-S/PERICOLACE) 8.6-50 MG per tablet 2 tablet (has no  administration in time range)   sodium chloride 0.9% BOLUS 1,000 mL (0 mLs Intravenous Stopped 3/15/25 0451)   ketorolac (TORADOL) injection 10 mg (10 mg Intravenous $Given 3/15/25 0141)   sodium chloride (PF) 0.9% PF flush 100 mL (65 mLs Intravenous $Given 3/15/25 0313)   iopamidol (ISOVUE-370) solution 500 mL (100 mLs Intravenous $Given 3/15/25 0313)     Procedures  Procedures     Discussion of Management  Admitting Hospitalist, Dr. SUKI Tamez    Additional Documentation  None    ED Course  ED Course as of 03/15/25 0615   Sat Mar 15, 2025   0120 I obtained history and examined the patient as noted above.    0446 I discussed with Dr. SUKI Tamez with the hospitalist team.     Medical Decision Making / Diagnosis   CMS Diagnoses: None    MIPS     None    MDM  Estephanie Brown is a 49 year old female who presents with abdominal pain with nausea/vomiting.  Differential diagnosis includes bowel obstruction, pancreatitis, gastritis, peptic ulcer disease, biliary colic, pancreatitis, etc.  Workup was pursued as noted.  Patient was noted to have leukocytosis.  CT was then performed and shows suspected partial small bowel obstruction near her anastomosis.  She did not have severe vomiting while in the ED so we will hold off on NG tube placement.  I discussed the hospitalist who will admit.    Disposition  The patient was admitted to the hospital.     ICD-10 Codes:    ICD-10-CM    1. Partial small bowel obstruction (H)  K56.600            Discharge Medications  New Prescriptions    No medications on file     Scribe Disclosure:  I, Tanesha Jade, am serving as a scribe at 1:25 AM on 3/15/2025 to document services personally performed by Ron Umanzor MD based on my observations and the provider's statements to me.      Ron Umanzor MD  03/15/25 3199

## 2025-03-15 NOTE — ED NOTES
[facility]  ED Nurse Handoff Report    ED Chief complaint: Abdominal Pain  . ED Diagnosis:   Final diagnoses:   Partial small bowel obstruction (H)       Allergies:   Allergies   Allergen Reactions    Birch Trees Dermatitis, Difficulty breathing, Hives, Itching, Shortness Of Breath and Swelling    Hydromorphone Other (See Comments)     Significant constipation    Mangifera Indica Fruit Ext ([place])  [Mangifera Indica] Difficulty breathing, Hives, Itching and Swelling    Medical Adhesive Remover Dermatitis, Hives, Itching and Swelling    Metformin Other (See Comments)    Morphine Itching, Nausea and Nausea and Vomiting    Morphine Unknown    Latex Dermatitis, Hives, Itching and Rash       Code Status: Full Code    Activity level - Baseline/Home:  independent.  Activity Level - Current:   independent.   Lift room needed: No.   Bariatric: No   Needed: No   Isolation: No.   Infection: Not Applicable.     Respiratory status: Room air    Vital Signs (within 30 minutes):   Vitals:    03/15/25 0105   BP: (!) 127/100   Pulse: 88   Resp: 18   Temp: 97.8  F (36.6  C)   TempSrc: Temporal   SpO2: 99%   Weight: 96.6 kg (212 lb 15.4 oz)       Cardiac Rhythm:  ,   Cardiac  Cardiac Rhythm: Normal sinus rhythm  Pain level:    Patient confused:  no .   Patient Falls Risk: bed/chair alarm on, nonskid shoes/slippers when out of bed, arm band in place, and patient and family education.   Elimination Status: Has voided and Urethral catheter (phipps) in place; refer to orders to discontinue as per protocol      Patient Report - Initial Complaint: abdominal pain.   Focused Assessment:     GastrointestinalGastrointestinal WDL: .WDL except (pt reports 7/10 periumbilical pain, vomitting and nausea.)Abdominal Appearance: rounded SA          0210  Cardiac  Cardiac (Adult)Cardiac WDL: WDLCardiac Rhythm: NSR SA       0210  Respiratory  RespiratoryAirway WDL: WDL  Respiratory WDLRespiratory WDL: WDL         Abnormal  Results:   Labs Ordered and Resulted from Time of ED Arrival to Time of ED Departure   CBC WITH PLATELETS AND DIFFERENTIAL - Abnormal       Result Value    WBC Count 13.9 (*)     RBC Count 4.61      Hemoglobin 14.6      Hematocrit 42.8      MCV 93      MCH 31.7      MCHC 34.1      RDW 12.1      Platelet Count 263      % Neutrophils 83      % Lymphocytes 11      % Monocytes 6      % Eosinophils 0      % Basophils 0      % Immature Granulocytes 0      NRBCs per 100 WBC 0      Absolute Neutrophils 11.5 (*)     Absolute Lymphocytes 1.5      Absolute Monocytes 0.8      Absolute Eosinophils 0.1      Absolute Basophils 0.0      Absolute Immature Granulocytes 0.1      Absolute NRBCs 0.0     COMPREHENSIVE METABOLIC PANEL - Normal    Sodium 137      Potassium 4.0      Carbon Dioxide (CO2) 27      Anion Gap 9      Urea Nitrogen 14.2      Creatinine 0.80      GFR Estimate >90      Calcium 9.4      Chloride 101      Glucose 91      Alkaline Phosphatase 65      AST 18      ALT 11      Protein Total 7.4      Albumin 4.3      Bilirubin Total 0.4     LIPASE - Normal    Lipase 25     HCG QUALITATIVE PREGNANCY - Normal    hCG Serum Qualitative Negative     ROUTINE UA WITH MICROSCOPIC REFLEX TO CULTURE        CT Abdomen Pelvis w Contrast   Final Result   IMPRESSION:   1.  Postsurgical changes of ileocolonic anastomosis. There is mild distal ileal wall thickening at the level of the ileocolic anastomosis with associated upstream multiple dilated small bowel loops, findings worrisome for at least partial small bowel    obstruction.   2.  Small amount of free fluid in the pelvis, indeterminate, could be physiological or reactive.          Treatments provided: see MAR  Family Comments: at bedside  OBS brochure/video discussed/provided to patient:  Yes  ED Medications:   Medications   scopolamine (TRANSDERM) 72 hr patch 1 patch (1 patch Transdermal $Patch/Med Applied 3/15/25 0143)     And   scopolamine (TRANSDERM-SCOP) Patch in Place (has  no administration in time range)   sodium chloride 0.9% BOLUS 1,000 mL (1,000 mLs Intravenous $New Bag 3/15/25 0133)   ketorolac (TORADOL) injection 10 mg (10 mg Intravenous $Given 3/15/25 0141)   sodium chloride (PF) 0.9% PF flush 100 mL (65 mLs Intravenous $Given 3/15/25 0313)   iopamidol (ISOVUE-370) solution 500 mL (100 mLs Intravenous $Given 3/15/25 0313)       Drips infusing:  No  For the majority of the shift this patient was Green.   Interventions performed were none needed.    Sepsis treatment initiated: No    Cares/treatment/interventions/medications to be completed following ED care: continue plan of care.    ED Nurse Name: Kiley Harris RN  4:49 AM    RECEIVING UNIT ED HANDOFF REVIEW    Above ED Nurse Handoff Report was reviewed: Yes  Reviewed by: Lilia Alston RN on March 15, 2025 at 9:09 AM   I Billy called the ED to inform them the note was read: Yes

## 2025-03-16 VITALS
SYSTOLIC BLOOD PRESSURE: 124 MMHG | WEIGHT: 211.1 LBS | HEART RATE: 78 BPM | DIASTOLIC BLOOD PRESSURE: 82 MMHG | RESPIRATION RATE: 16 BRPM | OXYGEN SATURATION: 98 % | TEMPERATURE: 97.8 F | BODY MASS INDEX: 30.29 KG/M2

## 2025-03-16 PROCEDURE — G0378 HOSPITAL OBSERVATION PER HR: HCPCS

## 2025-03-16 PROCEDURE — 99238 HOSP IP/OBS DSCHRG MGMT 30/<: CPT | Performed by: INTERNAL MEDICINE

## 2025-03-16 ASSESSMENT — ACTIVITIES OF DAILY LIVING (ADL)
ADLS_ACUITY_SCORE: 18

## 2025-03-16 NOTE — PHARMACY-ADMISSION MEDICATION HISTORY
Pharmacist Admission Medication History    Admission medication history is complete. The information provided in this note is only as accurate as the sources available at the time of the update.    Information Source(s): Patient via in-person and phone, [facility] record (2/13/2025), [employer].    Pertinent Information: -    Changes made to PTA medication list:  Added: biotin, Flonase, vitamin B12 q14d injection  Deleted: apap x 2, Allegra, amlodipine, asa, Humira, Levora, losartan, metformin, omeprazole, Proair, Recorcinol powder x 2, scopolamine patch, venlafaxine 37.5 mg entry, fluconazole, vitamin B12 liquid  Changed: magnesium 250 mg to Mg oxide 400 mg, vitamin D, spironolactone 100 mg daily to 50 mg TID, semaglutide to 1.7 mg per week.    Allergies reviewed with patient and updates made in EHR: unable to assess    Medication History Completed By: Brandy Ochoa PARVIN 3/15/2025 7:08 PM    Current Facility-Administered Medications for the 3/15/25 encounter (Hospital Encounter)   Medication    triamcinolone acetonide (KENALOG-10) injection 40 mg     PTA Med List   Medication Sig Last Dose/Taking    albuterol (PROAIR HFA/PROVENTIL HFA/VENTOLIN HFA) 108 (90 Base) MCG/ACT inhaler Inhale 2 puffs into the lungs every 4 hours as needed. Unknown    ALPRAZolam (XANAX) 0.25 MG tablet Take 0.25 mg by mouth 2 times daily as needed. Unknown    amitriptyline (ELAVIL) 10 MG tablet Take 10 mg by mouth at bedtime. 3/14/2025    Ascorbic Acid (VITAMIN C) 500 MG CHEW Take 500 mg by mouth 2 times daily. 3/14/2025    biotin 1000 MCG TABS tablet Take 1,000 mcg by mouth daily. 3/14/2025    busPIRone (BUSPAR) 10 MG tablet Take 10 mg by mouth 3 times daily. 3/14/2025    colestipol (COLESTID) 1 g tablet Take 1 g by mouth daily. 3/14/2025    cyanocobalamin (CYANOCOBALAMIN) 1000 mcg/mL injection Inject 1,000 mcg into the muscle every 14 days. 3/5/2025    fish oil-omega-3 fatty acids 1000 MG capsule Take 1 g by mouth daily. 3/14/2025     fluticasone (FLONASE) 50 MCG/ACT nasal spray Spray 1 spray into both nostrils daily. 3/14/2025    folic acid (FOLVITE) 1 MG tablet Take 1 mg by mouth daily. 3/14/2025    gabapentin (NEURONTIN) 300 MG capsule Take 300 mg by mouth 3 times daily. 3/14/2025    ketoconazole (NIZORAL) 2 % external shampoo daily as needed. Unknown    levonorgestrel (MIRENA) 52 MG (20 mcg/day) IUD by Intrauterine route once. Taking    loperamide (IMODIUM A-D) 2 MG tablet Take 2 mg by mouth daily as needed. Taking As Needed    magnesium oxide (MAG-OX) 400 MG tablet Take 400 mg by mouth daily. 3/14/2025    multivitamin w/minerals (THERA-VIT-M) tablet Take 1 tablet by mouth daily. 3/14/2025    Saccharomyces boulardii (PROBIOTIC) 250 MG CAPS Take 1 capsule by mouth daily. 3/14/2025    Semaglutide-Weight Management (WEGOVY) 1.7 MG/0.75ML pen Inject 1.7 mg subcutaneously once a week. 3/12/2025    simethicone (MYLICON) 80 MG chewable tablet Take 80 mg by mouth daily. 3/14/2025    spironolactone (ALDACTONE) 50 MG tablet Take 50 mg by mouth 3 times daily. 3/14/2025    triamcinolone (KENALOG) 0.1 % external ointment Apply topically 2 times daily as needed. Unknown    venlafaxine (EFFEXOR-XR) 150 MG 24 hr capsule 150 mg daily. 3/14/2025    vitamin D3 (CHOLECALCIFEROL) 50 mcg (2000 units) tablet Take 2 tablets by mouth daily. 3/14/2025

## 2025-03-16 NOTE — DISCHARGE SUMMARY
[facility]  Hospitalist Discharge Summary      Date of Admission:  3/15/2025  Date of Discharge:  3/16/2025  Discharging Provider: Ace Dupree MD  Discharge Service: Hospitalist Service  Primary Care Physician   Estephanie Zhang    Discharge Diagnoses   Abdomen pain  Concern for partial bowel obstruction  Crohn's disease  Hypertension  Morbidly obese  Depression and anxiety      Hospital Course   Estephanie Brown is a 49 year old female medical history significant for Crohn's disease status post surgical resection about 20 years ago (I do not have the details about the surgery), currently not on any treatment for Crohn's disease, in remission for about 5 years, hypertension, obesity, pression/anxiety who presented to the ER with complaint of abdominal pain, nausea and vomiting and was found to have partial small bowel obstruction.      Acute partial small bowel obstruction  Presented with 1 day history of nausea, vomiting, abdominal pain, and not passing gases.  Her last bowel movement was on Friday 3/14.  Hemodynamically stable.  Abdominal exam is reassuring.  Bowel sounds are present.  Labs overall reassuring except mild leukocytosis at 13.9 CT abdomen and pelvis showed mild distal ileal wall thickening at the level of ileocolic anastomosis with associated upstream multiple dilated small bowel loops, concerning for partial small bowel obstruction along with a small amount of free fluid in the pelvis which could be physiologic or reactive. Patient reported that she has not vomited for the past several hours so we held off placing an NG.  She was made NPO, placed on IV fluids and did have a gastrografin challenge showing dye in the colon 8 hours later.  She seen by surgery.  She was able to be placed on a clear liquid diet and advanced.  Her pain resolved. She will go home if tolerates a soft diet.    e     Crohn's disease  Per patient she is in remission and has not been on any medication since  2020.     Hypertension  Resume prior to admission amlodipine, losartan, spironolactone held as blood pressure on lower side at discharge        Morbidly Obesity  BMI >30, complicates all aspects of her care.   On metformin, was held while patient on the observation, resume upon discharge     Depression/anxiety  Resume prior to admission medication after pharmacy med reconciliation. Has been on buspar/effexor    Clinically Significant Risk Factors          Significant Results and Procedures   Most Recent 3 CBC's:  Recent Labs   Lab Test 03/15/25  0124 09/25/23  0932 09/11/23  0246   WBC 13.9* 22.8* 15.1*   HGB 14.6 17.5* 14.6   MCV 93 93 94    329 288     Most Recent 3 BMP's:  Recent Labs   Lab Test 03/15/25  0124 09/25/23  0932 09/11/23  0246    137 137   POTASSIUM 4.0 4.4 3.9   CHLORIDE 101 97* 100   CO2 27 25 28   BUN 14.2 14.3 10.7   CR 0.80 0.94 0.77   ANIONGAP 9 15 9   MOUNA 9.4 11.1* 9.8   GLC 91 157* 111*     Most Recent 2 LFT's:  Recent Labs   Lab Test 03/15/25  0124 09/25/23  0932   AST 18 23   ALT 11 15   ALKPHOS 65 83   BILITOTAL 0.4 0.6   ,   Results for orders placed or performed during the hospital encounter of 03/15/25   CT Abdomen Pelvis w Contrast    Narrative    EXAM: CT ABDOMEN PELVIS W CONTRAST  LOCATION: [facility]  DATE: 03/15/2025    INDICATION: Abdominal pain, vomiting.  COMPARISON: None.  TECHNIQUE: CT scan of the abdomen and pelvis was performed after the injection of 100 mL Isovue 370 intravenously. Multiplanar reformats were obtained. Dose reduction techniques were used.    FINDINGS:   LOWER CHEST: Lung bases are clear.      ABDOMEN/PELVIS:  HEPATOBILIARY: No suspicious focal hepatic lesion. No radiodense gallstones.    PANCREAS: No main pancreatic ductal dilatation or definite solid pancreatic mass.    SPLEEN: No splenomegaly.    ADRENAL GLANDS: No adrenal nodules.    KIDNEYS/BLADDER: No radiodense kidney/ureteral stones or hydronephrosis in either  kidney.    BOWEL: Postsurgical changes of ileocolonic anastomosis. Mild distal ileal wall thickening at the level of the ileocolic anastomosis with associated upstream multiple dilated small bowel loops, findings worrisome for at least partial small bowel   obstruction.    PERITONEUM: Small amount of free fluid in the pelvis, indeterminate, could be physiological or reactive.    PELVIC ORGANS: An IUD is visualized in appropriate position within the endometrial cavity.    VASCULATURE: Unremarkable.    LYMPH NODES: No significant abdominopelvic lymphadenopathy.    MUSCULOSKELETAL: No suspicious osseous lesion.      Impression    IMPRESSION:  1.  Postsurgical changes of ileocolonic anastomosis. There is mild distal ileal wall thickening at the level of the ileocolic anastomosis with associated upstream multiple dilated small bowel loops, findings worrisome for at least partial small bowel   obstruction.  2.  Small amount of free fluid in the pelvis, indeterminate, could be physiological or reactive.   XR Gastrografin Challenge    Narrative    EXAM: XR GASTROGRAFIN CHALLENGE  LOCATION: [facility]  DATE: 3/15/2025    INDICATION: Small bowel obstruction  COMPARISON: None.  TECHNIQUE: Routine water soluble contrast follow-through challenge.      Impression    IMPRESSION:  1.  Water soluble contrast material IS identified within the colon 8 hours post administration.  2.  There are some mildly prominent loops of small bowel measuring up to 4.2 cm in greatest radial dimension. The above findings would be most typical for a partial mechanical small bowel obstruction. Moderate hypertrophic changes most marked in the   lower lumbar spine.            Follow up/instructions: patient can follow up with her primary care provider as needed in a week    Pending test results at discharge:      Unresulted Labs Ordered in the Past 30 Days of this Admission       No orders found for last 31 day(s).              Discharge Orders      Reason for your hospital stay    Partial bowel obstruction     Follow-up and recommended labs and tests     Follow up with primary care provider, Estephanie Holcomb, within 7 days for hospital follow- up.  No follow up labs or test are needed.     Activity    Your activity upon discharge: activity as tolerated     Diet    Follow this diet upon discharge: Current Diet:Orders Placed This Encounter      Mechanical/Dental Soft Diet       Discharge Disposition   Discharged to home  Condition at discharge: Stable      Consultations This Hospital Stay   SURGERY GENERAL IP CONSULT    Code Status   Full Code    Time Spent on this Encounter   I, Ace Dupree MD, personally saw the patient today and spent less than or equal to 30 minutes discharging this patient. Discussed with nursing        This document was created using voice recognition technology.  Please excuse any typographical errors that may have occurred.  Please call with any questions.       Ace Dupree MD  [facility]  [address] [place] [address]  Phone: [phone]  Fax: [phone]  ______________________________________________________________________    Physical Exam   Vital Signs: Temp: 97.8  F (36.6  C) Temp src: Oral BP: 124/82 Pulse: 78   Resp: 16 SpO2: 98 % O2 Device: None (Room air)    Weight: 211 lbs 1.6 oz    Exam improved from admission  Constitutional: awake, alert, cooperative, no apparent distress, and appears stated age, sitting up in bed  HEENT: MMM  Respiratory: On room air, equal air entry bilaterally  Cardiovascular: Normal rate, regular rhythm, no murmur  GI: Soft, nontender, nondistended, bowel sounds present  Neurologic: Awake, alert, oriented x 3           Discharge Medications   Current Discharge Medication List        CONTINUE these medications which have NOT CHANGED    Details   albuterol (PROAIR HFA/PROVENTIL HFA/VENTOLIN HFA) 108 (90 Base) MCG/ACT inhaler  Inhale 2 puffs into the lungs every 4 hours as needed.    Comments: Pharmacy may dispense brand covered by insurance (Proair, or proventil or ventolin or generic albuterol inhaler)      ALPRAZolam (XANAX) 0.25 MG tablet Take 0.25 mg by mouth 2 times daily as needed.      amitriptyline (ELAVIL) 10 MG tablet Take 10 mg by mouth at bedtime.      Ascorbic Acid (VITAMIN C) 500 MG CHEW Take 500 mg by mouth 2 times daily.      biotin 1000 MCG TABS tablet Take 1,000 mcg by mouth daily.      busPIRone (BUSPAR) 10 MG tablet Take 10 mg by mouth 3 times daily.      colestipol (COLESTID) 1 g tablet Take 1 g by mouth daily.      cyanocobalamin (CYANOCOBALAMIN) 1000 mcg/mL injection Inject 1,000 mcg into the muscle every 14 days.      fish oil-omega-3 fatty acids 1000 MG capsule Take 1 g by mouth daily.      fluticasone (FLONASE) 50 MCG/ACT nasal spray Spray 1 spray into both nostrils daily.      folic acid (FOLVITE) 1 MG tablet Take 1 mg by mouth daily.      gabapentin (NEURONTIN) 300 MG capsule Take 300 mg by mouth 3 times daily.      ketoconazole (NIZORAL) 2 % external shampoo daily as needed.      levonorgestrel (MIRENA) 52 MG (20 mcg/day) IUD by Intrauterine route once.      magnesium oxide (MAG-OX) 400 MG tablet Take 400 mg by mouth daily.      multivitamin w/minerals (THERA-VIT-M) tablet Take 1 tablet by mouth daily.      Saccharomyces boulardii (PROBIOTIC) 250 MG CAPS Take 1 capsule by mouth daily.      Semaglutide-Weight Management (WEGOVY) 1.7 MG/0.75ML pen Inject 1.7 mg subcutaneously once a week.      simethicone (MYLICON) 80 MG chewable tablet Take 80 mg by mouth daily.      spironolactone (ALDACTONE) 50 MG tablet Take 50 mg by mouth 3 times daily.      triamcinolone (KENALOG) 0.1 % external ointment Apply topically 2 times daily as needed.      venlafaxine (EFFEXOR-XR) 150 MG 24 hr capsule 150 mg daily.      vitamin D3 (CHOLECALCIFEROL) 50 mcg (2000 units) tablet Take 2 tablets by mouth daily.      EPINEPHrine  (EPIPEN JR) 0.15 MG/0.3ML injection 2-pack as needed           STOP taking these medications       loperamide (IMODIUM A-D) 2 MG tablet Comments:   Reason for Stopping:             Allergies   Allergies   Allergen Reactions    [place]ch [place]rees Dermatitis, Difficulty breathing, Hives, Itching, Shortness Of Breath and Swelling    Hydromorphone Other (See Comments)     Significant constipation    Mangifera Indica Fruit Ext ([place])  [Mangifera Indica] Difficulty breathing, Hives, Itching and Swelling    Medical Adhesive Remover Dermatitis, Hives, Itching and Swelling    Metformin Other (See Comments)    Morphine Itching, Nausea and Nausea and Vomiting    Morphine Unknown    Latex Dermatitis, Hives, Itching and Rash

## 2025-03-16 NOTE — PLAN OF CARE
"  Problem: Adult Inpatient Plan of Care  Goal: Plan of Care Review  Description: The Plan of Care Review/Shift note should be completed every shift.  The Outcome Evaluation is a brief statement about your assessment that the patient is improving, declining, or no change.  This information will be displayed automatically on your shift  note.  Outcome: Progressing  Flowsheets (Taken 3/16/2025 0604)  Outcome Evaluation: Tolerating clear liquid diet, Pt had BM, Possible discharge home today.  Plan of Care Reviewed With: patient  Overall Patient Progress: improving  Goal: Patient-Specific Goal (Individualized)  Description: You can add care plan individualizations to a care plan. Examples of Individualization might be:  \"Parent requests to be called daily at 9am for status\", \"I have a hard time hearing out of my right ear\", or \"Do not touch me to wake me up as it startles  me\".  Outcome: Progressing  Goal: Absence of Hospital-Acquired Illness or Injury  Outcome: Progressing  Intervention: Identify and Manage Fall Risk  Recent Flowsheet Documentation  Taken 3/15/2025 1947 by Sena Ferrera RN  Safety Promotion/Fall Prevention:   activity supervised   clutter free environment maintained   lighting adjusted   mobility aid in reach   nonskid shoes/slippers when out of bed   safety round/check completed  Goal: Optimal Comfort and Wellbeing  Outcome: Progressing  Goal: Readiness for Transition of Care  Outcome: Progressing     Problem: Intestinal Obstruction  Goal: Optimal Bowel Function  Outcome: Progressing  Goal: Fluid and Electrolyte Balance  Outcome: Progressing  Goal: Absence of Infection Signs and Symptoms  Outcome: Progressing  Goal: Optimize Nutrition Status  Outcome: Progressing  Goal: Optimal Pain Control and Function  Outcome: Progressing   Goal Outcome Evaluation:      Plan of Care Reviewed With: patient    Overall Patient Progress: improvingOverall Patient Progress: improving    Outcome Evaluation: Tolerating " clear liquid diet, Pt had BM, Possible discharge home today.

## 2025-03-16 NOTE — PLAN OF CARE
"A/ox4. IV removed. Discharge instructions gone over and understanding verbalized. All belongings gathered and sent with patient. Family to transport home.           Problem: Adult Inpatient Plan of Care  Goal: Plan of Care Review  Description: The Plan of Care Review/Shift note should be completed every shift.  The Outcome Evaluation is a brief statement about your assessment that the patient is improving, declining, or no change.  This information will be displayed automatically on your shift  note.  Outcome: Met  Flowsheets (Taken 3/16/2025 0859)  Outcome Evaluation: discharging home  Plan of Care Reviewed With: patient  Overall Patient Progress: improving  Goal: Patient-Specific Goal (Individualized)  Description: You can add care plan individualizations to a care plan. Examples of Individualization might be:  \"Parent requests to be called daily at 9am for status\", \"I have a hard time hearing out of my right ear\", or \"Do not touch me to wake me up as it startles  me\".  Outcome: Met  Goal: Absence of Hospital-Acquired Illness or Injury  Outcome: Met  Intervention: Identify and Manage Fall Risk  Recent Flowsheet Documentation  Taken 3/16/2025 0856 by Lilia Alston RN  Safety Promotion/Fall Prevention:   assistive device/personal items within reach   clutter free environment maintained   nonskid shoes/slippers when out of bed   safety round/check completed  Intervention: Prevent Skin Injury  Recent Flowsheet Documentation  Taken 3/16/2025 0856 by Lilia Alston, RN  Body Position: position changed independently  Goal: Optimal Comfort and Wellbeing  Outcome: Met  Goal: Readiness for Transition of Care  Outcome: Met     Problem: Intestinal Obstruction  Goal: Optimal Bowel Function  Outcome: Met  Intervention: Promote Bowel Function  Recent Flowsheet Documentation  Taken 3/16/2025 0856 by Lilia Alston RN  Body Position: position changed independently  Head of Bed (HOB) Positioning: HOB at 20-30 " degrees  Positioning/Transfer Devices:   pillows   in use  Goal: Fluid and Electrolyte Balance  Outcome: Met  Goal: Absence of Infection Signs and Symptoms  Outcome: Met  Goal: Optimize Nutrition Status  Outcome: Met  Goal: Optimal Pain Control and Function  Outcome: Met           Goal Outcome Evaluation:      Plan of Care Reviewed With: patient    Overall Patient Progress: improvingOverall Patient Progress: improving    Outcome Evaluation: discharging home

## 2025-03-16 NOTE — SIGNIFICANT EVENT
Significant Event Note    Time of event: 7:26 PM March 15, 2025    Description of event:  Contacted by pharmacy to review medications and resume them as appropriate    Plan:  Reviewed medications patient is on semaglutide with her history of bowel resection and bowel obstruction.  Probably not the best choice given her history though I understand the perspective for weight loss intent        Kami Angela MD

## 2025-05-11 ENCOUNTER — HEALTH MAINTENANCE LETTER (OUTPATIENT)
Age: 50
End: 2025-05-11